# Patient Record
Sex: FEMALE | Race: WHITE | NOT HISPANIC OR LATINO | Employment: OTHER
[De-identification: names, ages, dates, MRNs, and addresses within clinical notes are randomized per-mention and may not be internally consistent; named-entity substitution may affect disease eponyms.]

---

## 2019-10-01 ENCOUNTER — HEALTH MAINTENANCE LETTER (OUTPATIENT)
Age: 69
End: 2019-10-01

## 2019-12-15 ENCOUNTER — HEALTH MAINTENANCE LETTER (OUTPATIENT)
Age: 69
End: 2019-12-15

## 2020-08-11 DIAGNOSIS — Z11.59 ENCOUNTER FOR SCREENING FOR OTHER VIRAL DISEASES: Primary | ICD-10-CM

## 2020-08-26 DIAGNOSIS — Z11.59 ENCOUNTER FOR SCREENING FOR OTHER VIRAL DISEASES: ICD-10-CM

## 2020-08-26 PROCEDURE — U0003 INFECTIOUS AGENT DETECTION BY NUCLEIC ACID (DNA OR RNA); SEVERE ACUTE RESPIRATORY SYNDROME CORONAVIRUS 2 (SARS-COV-2) (CORONAVIRUS DISEASE [COVID-19]), AMPLIFIED PROBE TECHNIQUE, MAKING USE OF HIGH THROUGHPUT TECHNOLOGIES AS DESCRIBED BY CMS-2020-01-R: HCPCS | Performed by: UROLOGY

## 2020-08-27 LAB
SARS-COV-2 RNA SPEC QL NAA+PROBE: NOT DETECTED
SPECIMEN SOURCE: NORMAL

## 2020-08-27 RX ORDER — HYDROCODONE BITARTRATE AND ACETAMINOPHEN 5; 325 MG/1; MG/1
1 TABLET ORAL EVERY 6 HOURS PRN
Status: ON HOLD | COMMUNITY
End: 2021-09-16

## 2020-08-28 ENCOUNTER — APPOINTMENT (OUTPATIENT)
Dept: GENERAL RADIOLOGY | Facility: CLINIC | Age: 70
End: 2020-08-28
Attending: UROLOGY
Payer: COMMERCIAL

## 2020-08-28 ENCOUNTER — ANESTHESIA EVENT (OUTPATIENT)
Dept: SURGERY | Facility: CLINIC | Age: 70
End: 2020-08-28
Payer: COMMERCIAL

## 2020-08-28 ENCOUNTER — ANESTHESIA (OUTPATIENT)
Dept: SURGERY | Facility: CLINIC | Age: 70
End: 2020-08-28
Payer: COMMERCIAL

## 2020-08-28 ENCOUNTER — HOSPITAL ENCOUNTER (OUTPATIENT)
Facility: CLINIC | Age: 70
Discharge: HOME | End: 2020-08-28
Attending: UROLOGY | Admitting: UROLOGY
Payer: COMMERCIAL

## 2020-08-28 VITALS
HEART RATE: 87 BPM | OXYGEN SATURATION: 96 % | BODY MASS INDEX: 30.32 KG/M2 | TEMPERATURE: 97.7 F | WEIGHT: 160.6 LBS | HEIGHT: 61 IN | DIASTOLIC BLOOD PRESSURE: 83 MMHG | SYSTOLIC BLOOD PRESSURE: 134 MMHG | RESPIRATION RATE: 14 BRPM

## 2020-08-28 DIAGNOSIS — R31.9 HEMATURIA, UNSPECIFIED TYPE: Primary | ICD-10-CM

## 2020-08-28 DIAGNOSIS — N20.0 RENAL CALCULUS: ICD-10-CM

## 2020-08-28 LAB
COPATH REPORT: NORMAL
GLUCOSE BLDC GLUCOMTR-MCNC: 133 MG/DL (ref 70–99)
GLUCOSE BLDC GLUCOMTR-MCNC: 150 MG/DL (ref 70–99)

## 2020-08-28 PROCEDURE — 25500064 ZZH RX 255 OP 636: Performed by: UROLOGY

## 2020-08-28 PROCEDURE — C1758 CATHETER, URETERAL: HCPCS | Performed by: UROLOGY

## 2020-08-28 PROCEDURE — C1894 INTRO/SHEATH, NON-LASER: HCPCS | Performed by: UROLOGY

## 2020-08-28 PROCEDURE — 88300 SURGICAL PATH GROSS: CPT | Performed by: UROLOGY

## 2020-08-28 PROCEDURE — 36000058 ZZH SURGERY LEVEL 3 EA 15 ADDTL MIN: Performed by: UROLOGY

## 2020-08-28 PROCEDURE — 40000170 ZZH STATISTIC PRE-PROCEDURE ASSESSMENT II: Performed by: UROLOGY

## 2020-08-28 PROCEDURE — C2617 STENT, NON-COR, TEM W/O DEL: HCPCS | Performed by: UROLOGY

## 2020-08-28 PROCEDURE — 25000566 ZZH SEVOFLURANE, EA 15 MIN: Performed by: UROLOGY

## 2020-08-28 PROCEDURE — 25000128 H RX IP 250 OP 636: Performed by: PHYSICIAN ASSISTANT

## 2020-08-28 PROCEDURE — 25800030 ZZH RX IP 258 OP 636: Performed by: NURSE ANESTHETIST, CERTIFIED REGISTERED

## 2020-08-28 PROCEDURE — 36000056 ZZH SURGERY LEVEL 3 1ST 30 MIN: Performed by: UROLOGY

## 2020-08-28 PROCEDURE — C1769 GUIDE WIRE: HCPCS | Performed by: UROLOGY

## 2020-08-28 PROCEDURE — 71000027 ZZH RECOVERY PHASE 2 EACH 15 MINS: Performed by: UROLOGY

## 2020-08-28 PROCEDURE — 40000277 XR SURGERY CARM FLUORO LESS THAN 5 MIN W STILLS: Mod: TC

## 2020-08-28 PROCEDURE — 25000125 ZZHC RX 250: Performed by: UROLOGY

## 2020-08-28 PROCEDURE — 37000008 ZZH ANESTHESIA TECHNICAL FEE, 1ST 30 MIN: Performed by: UROLOGY

## 2020-08-28 PROCEDURE — 25000128 H RX IP 250 OP 636: Performed by: ANESTHESIOLOGY

## 2020-08-28 PROCEDURE — 82962 GLUCOSE BLOOD TEST: CPT

## 2020-08-28 PROCEDURE — 25000125 ZZHC RX 250: Performed by: NURSE ANESTHETIST, CERTIFIED REGISTERED

## 2020-08-28 PROCEDURE — 27210794 ZZH OR GENERAL SUPPLY STERILE: Performed by: UROLOGY

## 2020-08-28 PROCEDURE — 25000128 H RX IP 250 OP 636: Performed by: NURSE ANESTHETIST, CERTIFIED REGISTERED

## 2020-08-28 PROCEDURE — 82365 CALCULUS SPECTROSCOPY: CPT | Performed by: UROLOGY

## 2020-08-28 PROCEDURE — 25800025 ZZH RX 258: Performed by: UROLOGY

## 2020-08-28 PROCEDURE — 88300 SURGICAL PATH GROSS: CPT | Mod: 26 | Performed by: UROLOGY

## 2020-08-28 PROCEDURE — 71000012 ZZH RECOVERY PHASE 1 LEVEL 1 FIRST HR: Performed by: UROLOGY

## 2020-08-28 PROCEDURE — 25000132 ZZH RX MED GY IP 250 OP 250 PS 637: Performed by: UROLOGY

## 2020-08-28 PROCEDURE — 37000009 ZZH ANESTHESIA TECHNICAL FEE, EACH ADDTL 15 MIN: Performed by: UROLOGY

## 2020-08-28 DEVICE — STENT URETERAL CONTOUR SOFT PERCUFLEX 6FRX24CM: Type: IMPLANTABLE DEVICE | Site: URETER | Status: FUNCTIONAL

## 2020-08-28 RX ORDER — MEPERIDINE HYDROCHLORIDE 25 MG/ML
12.5 INJECTION INTRAMUSCULAR; INTRAVENOUS; SUBCUTANEOUS
Status: DISCONTINUED | OUTPATIENT
Start: 2020-08-28 | End: 2020-08-28 | Stop reason: HOSPADM

## 2020-08-28 RX ORDER — ONDANSETRON 2 MG/ML
4 INJECTION INTRAMUSCULAR; INTRAVENOUS EVERY 30 MIN PRN
Status: DISCONTINUED | OUTPATIENT
Start: 2020-08-28 | End: 2020-08-28 | Stop reason: HOSPADM

## 2020-08-28 RX ORDER — MAGNESIUM HYDROXIDE 1200 MG/15ML
LIQUID ORAL PRN
Status: DISCONTINUED | OUTPATIENT
Start: 2020-08-28 | End: 2020-08-28 | Stop reason: HOSPADM

## 2020-08-28 RX ORDER — ONDANSETRON 4 MG/1
4 TABLET, ORALLY DISINTEGRATING ORAL EVERY 30 MIN PRN
Status: DISCONTINUED | OUTPATIENT
Start: 2020-08-28 | End: 2020-08-28 | Stop reason: HOSPADM

## 2020-08-28 RX ORDER — HYDROMORPHONE HYDROCHLORIDE 1 MG/ML
.3-.5 INJECTION, SOLUTION INTRAMUSCULAR; INTRAVENOUS; SUBCUTANEOUS EVERY 10 MIN PRN
Status: DISCONTINUED | OUTPATIENT
Start: 2020-08-28 | End: 2020-08-28 | Stop reason: HOSPADM

## 2020-08-28 RX ORDER — CEFAZOLIN SODIUM 2 G/100ML
2 INJECTION, SOLUTION INTRAVENOUS
Status: COMPLETED | OUTPATIENT
Start: 2020-08-28 | End: 2020-08-28

## 2020-08-28 RX ORDER — HYDROCODONE BITARTRATE AND ACETAMINOPHEN 5; 325 MG/1; MG/1
1 TABLET ORAL ONCE
Status: COMPLETED | OUTPATIENT
Start: 2020-08-28 | End: 2020-08-28

## 2020-08-28 RX ORDER — SODIUM CHLORIDE, SODIUM LACTATE, POTASSIUM CHLORIDE, CALCIUM CHLORIDE 600; 310; 30; 20 MG/100ML; MG/100ML; MG/100ML; MG/100ML
INJECTION, SOLUTION INTRAVENOUS CONTINUOUS
Status: DISCONTINUED | OUTPATIENT
Start: 2020-08-28 | End: 2020-08-28 | Stop reason: HOSPADM

## 2020-08-28 RX ORDER — FENTANYL CITRATE 50 UG/ML
INJECTION, SOLUTION INTRAMUSCULAR; INTRAVENOUS PRN
Status: DISCONTINUED | OUTPATIENT
Start: 2020-08-28 | End: 2020-08-28

## 2020-08-28 RX ORDER — HYDRALAZINE HYDROCHLORIDE 20 MG/ML
2.5-5 INJECTION INTRAMUSCULAR; INTRAVENOUS EVERY 10 MIN PRN
Status: DISCONTINUED | OUTPATIENT
Start: 2020-08-28 | End: 2020-08-28 | Stop reason: HOSPADM

## 2020-08-28 RX ORDER — ONDANSETRON 2 MG/ML
INJECTION INTRAMUSCULAR; INTRAVENOUS PRN
Status: DISCONTINUED | OUTPATIENT
Start: 2020-08-28 | End: 2020-08-28

## 2020-08-28 RX ORDER — SULFAMETHOXAZOLE/TRIMETHOPRIM 800-160 MG
1 TABLET ORAL 2 TIMES DAILY
Qty: 10 TABLET | Refills: 0 | Status: SHIPPED | OUTPATIENT
Start: 2020-08-28 | End: 2020-09-02

## 2020-08-28 RX ORDER — PROPOFOL 10 MG/ML
INJECTION, EMULSION INTRAVENOUS PRN
Status: DISCONTINUED | OUTPATIENT
Start: 2020-08-28 | End: 2020-08-28

## 2020-08-28 RX ORDER — PROPOFOL 10 MG/ML
INJECTION, EMULSION INTRAVENOUS CONTINUOUS PRN
Status: DISCONTINUED | OUTPATIENT
Start: 2020-08-28 | End: 2020-08-28

## 2020-08-28 RX ORDER — ACETAMINOPHEN 325 MG/1
650 TABLET ORAL ONCE
Status: DISCONTINUED | OUTPATIENT
Start: 2020-08-28 | End: 2020-08-28 | Stop reason: HOSPADM

## 2020-08-28 RX ORDER — HYDROCODONE BITARTRATE AND ACETAMINOPHEN 5; 325 MG/1; MG/1
1 TABLET ORAL EVERY 4 HOURS PRN
Qty: 15 TABLET | Refills: 0 | Status: ON HOLD | OUTPATIENT
Start: 2020-08-28 | End: 2021-09-16

## 2020-08-28 RX ORDER — ALBUTEROL SULFATE 0.83 MG/ML
2.5 SOLUTION RESPIRATORY (INHALATION) EVERY 4 HOURS PRN
Status: DISCONTINUED | OUTPATIENT
Start: 2020-08-28 | End: 2020-08-28 | Stop reason: HOSPADM

## 2020-08-28 RX ORDER — NALOXONE HYDROCHLORIDE 0.4 MG/ML
.1-.4 INJECTION, SOLUTION INTRAMUSCULAR; INTRAVENOUS; SUBCUTANEOUS
Status: DISCONTINUED | OUTPATIENT
Start: 2020-08-28 | End: 2020-08-28 | Stop reason: HOSPADM

## 2020-08-28 RX ORDER — SODIUM CHLORIDE, SODIUM LACTATE, POTASSIUM CHLORIDE, CALCIUM CHLORIDE 600; 310; 30; 20 MG/100ML; MG/100ML; MG/100ML; MG/100ML
INJECTION, SOLUTION INTRAVENOUS CONTINUOUS PRN
Status: DISCONTINUED | OUTPATIENT
Start: 2020-08-28 | End: 2020-08-28

## 2020-08-28 RX ORDER — FENTANYL CITRATE 50 UG/ML
25-50 INJECTION, SOLUTION INTRAMUSCULAR; INTRAVENOUS
Status: DISCONTINUED | OUTPATIENT
Start: 2020-08-28 | End: 2020-08-28 | Stop reason: HOSPADM

## 2020-08-28 RX ORDER — DEXAMETHASONE SODIUM PHOSPHATE 4 MG/ML
INJECTION, SOLUTION INTRA-ARTICULAR; INTRALESIONAL; INTRAMUSCULAR; INTRAVENOUS; SOFT TISSUE PRN
Status: DISCONTINUED | OUTPATIENT
Start: 2020-08-28 | End: 2020-08-28

## 2020-08-28 RX ORDER — IOPAMIDOL 612 MG/ML
INJECTION, SOLUTION INTRAVASCULAR PRN
Status: DISCONTINUED | OUTPATIENT
Start: 2020-08-28 | End: 2020-08-28 | Stop reason: HOSPADM

## 2020-08-28 RX ORDER — LIDOCAINE HYDROCHLORIDE 20 MG/ML
INJECTION, SOLUTION INFILTRATION; PERINEURAL PRN
Status: DISCONTINUED | OUTPATIENT
Start: 2020-08-28 | End: 2020-08-28

## 2020-08-28 RX ADMIN — CEFAZOLIN SODIUM 2 G: 2 INJECTION, SOLUTION INTRAVENOUS at 10:06

## 2020-08-28 RX ADMIN — MIDAZOLAM 2 MG: 1 INJECTION INTRAMUSCULAR; INTRAVENOUS at 09:51

## 2020-08-28 RX ADMIN — FENTANYL CITRATE 50 MCG: 0.05 INJECTION, SOLUTION INTRAMUSCULAR; INTRAVENOUS at 11:05

## 2020-08-28 RX ADMIN — DEXAMETHASONE SODIUM PHOSPHATE 4 MG: 4 INJECTION, SOLUTION INTRA-ARTICULAR; INTRALESIONAL; INTRAMUSCULAR; INTRAVENOUS; SOFT TISSUE at 10:14

## 2020-08-28 RX ADMIN — PROPOFOL 25 MCG/KG/MIN: 10 INJECTION, EMULSION INTRAVENOUS at 09:55

## 2020-08-28 RX ADMIN — FENTANYL CITRATE 50 MCG: 50 INJECTION, SOLUTION INTRAMUSCULAR; INTRAVENOUS at 10:02

## 2020-08-28 RX ADMIN — PROPOFOL 120 MG: 10 INJECTION, EMULSION INTRAVENOUS at 09:54

## 2020-08-28 RX ADMIN — PHENYLEPHRINE HYDROCHLORIDE 100 MCG: 10 INJECTION INTRAVENOUS at 10:09

## 2020-08-28 RX ADMIN — ONDANSETRON 4 MG: 2 INJECTION INTRAMUSCULAR; INTRAVENOUS at 10:37

## 2020-08-28 RX ADMIN — FENTANYL CITRATE 50 MCG: 50 INJECTION, SOLUTION INTRAMUSCULAR; INTRAVENOUS at 10:18

## 2020-08-28 RX ADMIN — SODIUM CHLORIDE, POTASSIUM CHLORIDE, SODIUM LACTATE AND CALCIUM CHLORIDE: 600; 310; 30; 20 INJECTION, SOLUTION INTRAVENOUS at 09:51

## 2020-08-28 RX ADMIN — HYDROCODONE BITARTRATE AND ACETAMINOPHEN 1 TABLET: 5; 325 TABLET ORAL at 11:23

## 2020-08-28 RX ADMIN — LIDOCAINE HYDROCHLORIDE 60 MG: 20 INJECTION, SOLUTION INFILTRATION; PERINEURAL at 09:54

## 2020-08-28 ASSESSMENT — MIFFLIN-ST. JEOR: SCORE: 1190.86

## 2020-08-28 ASSESSMENT — ENCOUNTER SYMPTOMS
DYSRHYTHMIAS: 0
SEIZURES: 0

## 2020-08-28 ASSESSMENT — COPD QUESTIONNAIRES: COPD: 0

## 2020-08-28 ASSESSMENT — LIFESTYLE VARIABLES: TOBACCO_USE: 0

## 2020-08-28 NOTE — BRIEF OP NOTE
[facility] Urology Brief Operative Note    Pre-operative diagnosis: nephrolithiasis [N20.0]   Post-operative diagnosis: Same   Procedure: Procedure(s):  CYSTOSCOPY, LEFT RETROGRADE, LEFT URETEROSCOPY, WITH LITHOTRIPSY USING LASER, STONE BASKETING, LEFT URETERAL STENT INSERTION     Surgeon: Juan Luis Nagy MD         Anesthesia: General mask     Estimated blood loss: None   Total IV fluids:  ml   Blood transfusion: No transfusion was given during surgery   Total urine output: Not measured   Drains: None   Specimens: Left renal stone   Implants: 6F x 24 cm left ureteral stent   Complications: None   Condition: Patient taken to recovery in stable condition.     Findings:   None.  Complications: 9mm left renal pelvis stone, easily fragmented.  Moderate stone gravel.  Follow-up : 2 weeks for stent removal    Juan Luis Nagy MD  8/28/2020

## 2020-08-28 NOTE — ANESTHESIA CARE TRANSFER NOTE
Patient: Charlette Sharp    Procedure(s):  CYSTOSCOPY, LEFT RETROGRADE, LEFT URETEROSCOPY, WITH LITHOTRIPSY USING LASER, STONE BASKETING, LEFT URETERAL STENT INSERTION    Diagnosis: Uric acid nephrolithiasis [N20.0]  Diagnosis Additional Information: No value filed.    Anesthesia Type:   General     Note:  Airway :Face Mask    Comments: VSS. Airway and IV patent. Patient comfortable. Report to RN. Stable care transfer.      Vitals: (Last set prior to Anesthesia Care Transfer)    CRNA VITALS  8/28/2020 1014 - 8/28/2020 1049      8/28/2020             Pulse:  69    SpO2:  100 %    Resp Rate (observed):  11    Resp Rate (set):  10                Electronically Signed By: AR Ibarra CRNA  August 28, 2020  10:49 AM

## 2020-08-28 NOTE — ANESTHESIA POSTPROCEDURE EVALUATION
Patient: Charlette Sharp    Procedure(s):  CYSTOSCOPY, LEFT RETROGRADE, LEFT URETEROSCOPY, WITH LITHOTRIPSY USING LASER, STONE BASKETING, LEFT URETERAL STENT INSERTION    Diagnosis:Uric acid nephrolithiasis [N20.0]  Diagnosis Additional Information: No value filed.    Anesthesia Type:  General    Note:  Anesthesia Post Evaluation    Patient location during evaluation: PACU  Patient participation: Able to fully participate in evaluation  Level of consciousness: awake and alert  Pain management: adequate  Airway patency: patent  Cardiovascular status: acceptable  Respiratory status: acceptable  Hydration status: acceptable  PONV: none     Anesthetic complications: None          Last vitals:  Vitals:    08/28/20 1115 08/28/20 1130 08/28/20 1207   BP: 132/85 (!) 140/81 134/83   Pulse: 87 91 87   Resp: 17 13 14   Temp: 36.3  C (97.3  F) 36.9  C (98.4  F) 36.5  C (97.7  F)   SpO2: 94% 94% 96%         Electronically Signed By: Erna Rodarte MD  August 28, 2020  1:37 PM

## 2020-08-28 NOTE — OR NURSING
Fingerstick blood glucose 133  Anesthesiologist Dr Hewitt informed. PNDS met, po per I&O sheet. Pt dressed, up in recliner and transported to Phase 2.

## 2020-08-28 NOTE — OP NOTE
Procedure Date: 08/28/2020      PREOPERATIVE DIAGNOSIS:  Left renal calculus.      POSTOPERATIVE DIAGNOSIS:  Left renal calculus.      PROCEDURE PERFORMED:   1.  Left ureteroscopy with holmium laser lithotripsy and stone basketing.   2.  Left retrograde pyelogram.   3.  Left ureteral stent placement.   4.  Intraoperative interpretation of fluoroscopic images.      OPERATIVE INDICATIONS:  The patient is a 70-year-old woman who presented with left flank pain, and CT revealed a 9 mm left renal pelvis stone.  After understanding the various management options, she elected to undergo today's procedure.      DESCRIPTION OF PROCEDURE:  After properly identifying the patient and verifying informed consent, she was brought to the operating room in stable condition.  After sufficient induction of general anesthetic, she was placed in lithotomy position, and her genitalia and perineum were prepped and draped in the usual fashion.  The case was begun by inserting a well-lubricated cystoscope in the patient's bladder under direct vision.  The bladder was surveyed in its entirety and found to be free from tumor, stones or diverticula.  The left ureteral orifice was identified, and I passed a sensor guidewire up to the renal pelvis on fluoroscopy.  The stone was not well seen on  imaging.  I then passed a 6-H[place] Konger open-ended catheter over the wire and injected contrast to perform a left retrograde pyelogram.  There was no hydronephrosis.  There was a filling defect in the renal pelvis, consistent with the patient's known stone.  There were no other filling defects.  I then placed a Super Stiff wire through the open-ended catheter, advanced a 10-H[place] Konger dual-lumen catheter over this, and then advanced a sensor guidewire alongside the Super Stiff wire.  I then advanced an 11/13 ureteral access sheath over the Super Stiff wire, first with the 11-Hong Konger inner component.  There was mild resistance, but this did pass up to the  proximal ureter without too much difficulty.  I then advanced the combined inner and outer sheath over the Super Stiff wire up to the proximal ureter.  There was resistance here, so I stopped advancing at this point, I then introduced the flexible ureteroscope and visualized a spiculated gold colored stone in the renal pelvis.  I then used the 200 micron holmium laser fiber to fragment this.  This fragmented very easily and essentially shattered once laser energy was applied.  I then used the [place] Halo basket to retrieve several small fragments to send for stone composition analysis.  I then reintroduced the scope and performed systematic pyeloscopy.  There was a moderate amount of stone gravel with pieces approximately 1-2 mm, but no pieces larger than this were visible.  I then performed pullback ureteroscopy.  In the proximal ureter, there was a very small mucosal laceration seen.  The remainder of the ureter was without evidence of injury.  I then injected a small amount of contrast to delineate the collecting system.  There was a very small amount of extravasation around the ureter where that laceration was seen, but otherwise there was no hydronephrosis and no extravasation.  I then placed a 6-Nepali x 24 cm double-J ureteral stent over the guidewire with the assistance of fluoroscopy.  Excellent proximal and distal coils were visualized.  The bladder was then emptied via the cystoscope.  The patient was awoken from anesthesia and brought to recovery in stable condition.  There was no blood loss.      The patient should follow-up in about 2 weeks for stent removal.         RAMÍREZ GUZMAN MD             D: 2020   T: 2020   MT:       Name:     JENY WILLAMS   MRN:      [id number]        Account:        [id number]   :      1950           Procedure Date: 2020      Document: [id number]

## 2020-08-28 NOTE — ANESTHESIA PREPROCEDURE EVALUATION
Anesthesia Pre-Procedure Evaluation    Patient: Charlette Sharp   MRN: [id number] : 1950          Preoperative Diagnosis: Uric acid nephrolithiasis [N20.0]    Procedure(s):  CYSTOSCOPY, LEFT URETEROSCOPY, WITH LITHOTRIPSY USING LASER AND LEFT URETERAL STENT INSERTION    Past Medical History:   Diagnosis Date     Anemia      B12 deficiency      Chronic diarrhea      Depressive disorder      Diabetes mellitus (H)      Diverticulitis      Gastro-oesophageal reflux disease      Hypercholesteraemia      Hyperlipidemia      Hypertension      Insomnia      Kidney stone      MS (multiple sclerosis) (H)     generalized weakness     Pancreatic pseudocyst      Pancreatitis      Restless legs      Past Surgical History:   Procedure Laterality Date     APPENDECTOMY       CHOLECYSTECTOMY       COLONOSCOPY       LAPAROSCOPIC CRYOABLATION TUMOR KIDNEY  2013    Procedure: LAPAROSCOPIC CRYOABLATION TUMOR KIDNEY;;  Surgeon: Elijah Corona MD;  Location: [facility] OR     NEPHRECTOMY PARTIAL  2013    Procedure: NEPHRECTOMY PARTIAL;  LAPAROSCOPIC RIGHT RENAL CRYOABLATION;  Surgeon: Elijah Corona MD;  Location: [facility] OR     No Known Allergies  Prior to Admission medications    Medication Sig Start Date End Date Taking? Authorizing Provider   Ascorbic Acid (VITAMIN C PO) Take 100 mg by mouth daily    Yes Reported, Patient   Cyanocobalamin (VITAMIN B 12 PO) Take 1,000 mcg by mouth daily    Yes Reported, Patient   Dalfampridine (AMPYRA PO) Take 10 mg by mouth 2 times daily   Yes Unknown, Entered By History   HYDROcodone-acetaminophen (NORCO) 5-325 MG tablet Take 1 tablet by mouth every 6 hours as needed for severe pain   Yes Reported, Patient   insulin glargine (LANTUS PEN) 100 UNIT/ML pen Inject 23 Units Subcutaneous At Bedtime    Yes Reported, Patient   METFORMIN HCL PO Take 1,000 mg by mouth 2 times daily (with meals)   Yes Unknown, Entered By History   OMEPRAZOLE PO Take 20 mg by mouth daily    Yes  Reported, Patient   Pramipexole Dihydrochloride (MIRAPEX PO) Take 1 mg by mouth 2 times daily    Yes Reported, Patient   SERTRALINE HCL PO Take 50 mg by mouth daily   Yes Reported, Patient   Simvastatin (ZOCOR PO) Take 40 mg by mouth daily    Yes Reported, Patient   TRAZODONE HCL PO Take 50 mg by mouth nightly as needed    Yes Reported, Patient   VITAMIN D, CHOLECALCIFEROL, PO Take 2,000 Units by mouth daily    Yes Unknown, Entered By History   ACCU-CHEK ESPERANZA None Entered    Reported, Patient   ASPIRIN PO Take 81 mg by mouth daily    Unknown, Entered By History   calcium-magnesium (CALMAG) 500-250 MG TABS Take 1 tablet by mouth daily    Reported, Patient   Ferrous Sulfate (IRON SUPPLEMENT PO) Take 325 mg by mouth 2 times daily (with meals)     Reported, Patient     RECENT LABS: hgb 13.6, plt 296  ECG: NSR    Anesthesia Evaluation     .             ROS/MED HX    ENT/Pulmonary:      (-) tobacco use, asthma, COPD and sleep apnea   Neurologic:     (+)Multiple Sclerosis other neuro RLS   (-) seizures, CVA and migraines   Cardiovascular:     (+) Dyslipidemia, hypertension----. : . . . :. .      (-) CAD, LOMAS, arrhythmias and valvular problems/murmurs   METS/Exercise Tolerance:  1 - Eating, dressing   Hematologic:        (-) history of blood clots, anemia and other hematologic disorder   Musculoskeletal:   (+) arthritis,  -       GI/Hepatic:     (+) GERD Other GI/Hepatic hx pancreatitis     (-) liver disease   Renal/Genitourinary:     (+) Nephrolithiasis ,    (-) renal disease   Endo:     (+) type II DM Last HgA1c: 7.0 .   (-) Type I DM and obesity   Psychiatric:     (+) depression     (-) psychiatric history   Infectious Disease:        (-) Recent Fever   Malignancy:         Other:                          Physical Exam  Normal systems: cardiovascular, pulmonary and dental    Airway   Mallampati: II  TM distance: >3 FB  Neck ROM: full    Dental     Cardiovascular   Rhythm and rate: regular and normal      Pulmonary     "breath sounds clear to auscultation            Preop Vitals  BP Readings from Last 3 Encounters:   08/28/20 129/85   05/08/15 97/69   11/22/13 106/70    Pulse Readings from Last 3 Encounters:   09/14/13 52      Resp Readings from Last 3 Encounters:   08/28/20 16   05/08/15 16   11/22/13 18    SpO2 Readings from Last 3 Encounters:   08/28/20 98%   05/08/15 95%   11/22/13 95%      Temp Readings from Last 1 Encounters:   08/28/20 37  C (98.6  F) (Oral)    Ht Readings from Last 1 Encounters:   08/28/20 1.549 m (5' 1\")      Wt Readings from Last 1 Encounters:   08/28/20 72.8 kg (160 lb 9.6 oz)    Estimated body mass index is 30.35 kg/m  as calculated from the following:    Height as of this encounter: 1.549 m (5' 1\").    Weight as of this encounter: 72.8 kg (160 lb 9.6 oz).       Anesthesia Plan      History & Physical Review      ASA Status:  3 .    NPO Status:  > 8 hours    Plan for General Maintenance will be Balanced.    PONV prophylaxis:  Ondansetron (or other 5HT-3) and Dexamethasone or Solumedrol  Background propofol infusion        Postoperative Care  Postoperative pain management:  Multi-modal analgesia.      Consents  Anesthetic plan, risks, benefits and alternatives discussed with:  Patient..                 Erna Rodarte MD  "

## 2020-09-03 LAB
APPEARANCE STONE: NORMAL
COMPN STONE: NORMAL
NUMBER STONE: 6
SIZE STONE: NORMAL MM
WT STONE: 2 MG

## 2021-01-15 ENCOUNTER — HEALTH MAINTENANCE LETTER (OUTPATIENT)
Age: 71
End: 2021-01-15

## 2021-09-04 ENCOUNTER — HEALTH MAINTENANCE LETTER (OUTPATIENT)
Age: 71
End: 2021-09-04

## 2021-09-15 ENCOUNTER — APPOINTMENT (OUTPATIENT)
Dept: CT IMAGING | Facility: CLINIC | Age: 71
End: 2021-09-15
Attending: EMERGENCY MEDICINE
Payer: COMMERCIAL

## 2021-09-15 ENCOUNTER — HOSPITAL ENCOUNTER (OUTPATIENT)
Facility: CLINIC | Age: 71
Setting detail: OBSERVATION
Discharge: HOME | End: 2021-09-17
Attending: EMERGENCY MEDICINE | Admitting: INTERNAL MEDICINE
Payer: COMMERCIAL

## 2021-09-15 DIAGNOSIS — N13.5 UPJ (URETEROPELVIC JUNCTION) OBSTRUCTION: ICD-10-CM

## 2021-09-15 LAB
ALBUMIN SERPL-MCNC: 3.4 G/DL (ref 3.4–5)
ALBUMIN UR-MCNC: 100 MG/DL
ALP SERPL-CCNC: 78 U/L (ref 40–150)
ALT SERPL W P-5'-P-CCNC: 21 U/L (ref 0–50)
ANION GAP SERPL CALCULATED.3IONS-SCNC: 7 MMOL/L (ref 3–14)
APPEARANCE UR: ABNORMAL
AST SERPL W P-5'-P-CCNC: 12 U/L (ref 0–45)
BASOPHILS # BLD AUTO: 0 10E3/UL (ref 0–0.2)
BASOPHILS NFR BLD AUTO: 0 %
BILIRUB SERPL-MCNC: 0.5 MG/DL (ref 0.2–1.3)
BILIRUB UR QL STRIP: NEGATIVE
BUN SERPL-MCNC: 15 MG/DL (ref 7–30)
CALCIUM SERPL-MCNC: 8.1 MG/DL (ref 8.5–10.1)
CHLORIDE BLD-SCNC: 108 MMOL/L (ref 94–109)
CO2 SERPL-SCNC: 27 MMOL/L (ref 20–32)
COLOR UR AUTO: ABNORMAL
CREAT BLD-MCNC: 1.1 MG/DL (ref 0.5–1)
CREAT SERPL-MCNC: 1.04 MG/DL (ref 0.52–1.04)
EOSINOPHIL # BLD AUTO: 0.3 10E3/UL (ref 0–0.7)
EOSINOPHIL NFR BLD AUTO: 3 %
ERYTHROCYTE [DISTWIDTH] IN BLOOD BY AUTOMATED COUNT: 13.5 % (ref 10–15)
GFR SERPL CREATININE-BSD FRML MDRD: 51 ML/MIN/1.73M2
GFR SERPL CREATININE-BSD FRML MDRD: 55 ML/MIN/1.73M2
GLUCOSE BLD-MCNC: 172 MG/DL (ref 70–99)
GLUCOSE UR STRIP-MCNC: NEGATIVE MG/DL
HCT VFR BLD AUTO: 35.8 % (ref 35–47)
HGB BLD-MCNC: 11 G/DL (ref 11.7–15.7)
HGB UR QL STRIP: ABNORMAL
HOLD SPECIMEN: NORMAL
HOLD SPECIMEN: NORMAL
IMM GRANULOCYTES # BLD: 0 10E3/UL
IMM GRANULOCYTES NFR BLD: 0 %
KETONES UR STRIP-MCNC: 10 MG/DL
LACTATE SERPL-SCNC: 1 MMOL/L (ref 0.7–2)
LACTATE SERPL-SCNC: 2.9 MMOL/L (ref 0.7–2)
LEUKOCYTE ESTERASE UR QL STRIP: ABNORMAL
LIPASE SERPL-CCNC: 69 U/L (ref 73–393)
LYMPHOCYTES # BLD AUTO: 1.7 10E3/UL (ref 0.8–5.3)
LYMPHOCYTES NFR BLD AUTO: 17 %
MCH RBC QN AUTO: 26.4 PG (ref 26.5–33)
MCHC RBC AUTO-ENTMCNC: 30.7 G/DL (ref 31.5–36.5)
MCV RBC AUTO: 86 FL (ref 78–100)
MONOCYTES # BLD AUTO: 0.5 10E3/UL (ref 0–1.3)
MONOCYTES NFR BLD AUTO: 5 %
MUCOUS THREADS #/AREA URNS LPF: PRESENT /LPF
NEUTROPHILS # BLD AUTO: 7.3 10E3/UL (ref 1.6–8.3)
NEUTROPHILS NFR BLD AUTO: 75 %
NITRATE UR QL: NEGATIVE
NRBC # BLD AUTO: 0 10E3/UL
NRBC BLD AUTO-RTO: 0 /100
PH UR STRIP: 5.5 [PH] (ref 5–7)
PLATELET # BLD AUTO: 244 10E3/UL (ref 150–450)
POTASSIUM BLD-SCNC: 4 MMOL/L (ref 3.4–5.3)
PROT SERPL-MCNC: 6.7 G/DL (ref 6.8–8.8)
RBC # BLD AUTO: 4.17 10E6/UL (ref 3.8–5.2)
RBC URINE: >182 /HPF
SARS-COV-2 RNA RESP QL NAA+PROBE: NEGATIVE
SODIUM SERPL-SCNC: 142 MMOL/L (ref 133–144)
SP GR UR STRIP: 1.03 (ref 1–1.03)
SQUAMOUS EPITHELIAL: 1 /HPF
UROBILINOGEN UR STRIP-MCNC: NORMAL MG/DL
WBC # BLD AUTO: 9.9 10E3/UL (ref 4–11)
WBC URINE: 37 /HPF
YEAST #/AREA URNS HPF: ABNORMAL /HPF

## 2021-09-15 PROCEDURE — 99285 EMERGENCY DEPT VISIT HI MDM: CPT | Mod: 25

## 2021-09-15 PROCEDURE — 258N000003 HC RX IP 258 OP 636: Performed by: EMERGENCY MEDICINE

## 2021-09-15 PROCEDURE — 250N000009 HC RX 250: Performed by: EMERGENCY MEDICINE

## 2021-09-15 PROCEDURE — 36415 COLL VENOUS BLD VENIPUNCTURE: CPT | Performed by: EMERGENCY MEDICINE

## 2021-09-15 PROCEDURE — 250N000011 HC RX IP 250 OP 636: Performed by: EMERGENCY MEDICINE

## 2021-09-15 PROCEDURE — 80053 COMPREHEN METABOLIC PANEL: CPT | Performed by: EMERGENCY MEDICINE

## 2021-09-15 PROCEDURE — 96365 THER/PROPH/DIAG IV INF INIT: CPT | Mod: 59

## 2021-09-15 PROCEDURE — 82565 ASSAY OF CREATININE: CPT | Mod: 91

## 2021-09-15 PROCEDURE — 96376 TX/PRO/DX INJ SAME DRUG ADON: CPT

## 2021-09-15 PROCEDURE — 83605 ASSAY OF LACTIC ACID: CPT | Performed by: EMERGENCY MEDICINE

## 2021-09-15 PROCEDURE — 83690 ASSAY OF LIPASE: CPT | Performed by: EMERGENCY MEDICINE

## 2021-09-15 PROCEDURE — 74177 CT ABD & PELVIS W/CONTRAST: CPT

## 2021-09-15 PROCEDURE — 87635 SARS-COV-2 COVID-19 AMP PRB: CPT | Performed by: EMERGENCY MEDICINE

## 2021-09-15 PROCEDURE — 96361 HYDRATE IV INFUSION ADD-ON: CPT

## 2021-09-15 PROCEDURE — 83036 HEMOGLOBIN GLYCOSYLATED A1C: CPT | Performed by: INTERNAL MEDICINE

## 2021-09-15 PROCEDURE — 87040 BLOOD CULTURE FOR BACTERIA: CPT | Performed by: EMERGENCY MEDICINE

## 2021-09-15 PROCEDURE — C9803 HOPD COVID-19 SPEC COLLECT: HCPCS

## 2021-09-15 PROCEDURE — 96375 TX/PRO/DX INJ NEW DRUG ADDON: CPT

## 2021-09-15 PROCEDURE — 85025 COMPLETE CBC W/AUTO DIFF WBC: CPT | Performed by: EMERGENCY MEDICINE

## 2021-09-15 PROCEDURE — 87086 URINE CULTURE/COLONY COUNT: CPT | Performed by: EMERGENCY MEDICINE

## 2021-09-15 PROCEDURE — 81001 URINALYSIS AUTO W/SCOPE: CPT | Performed by: EMERGENCY MEDICINE

## 2021-09-15 RX ORDER — HYDROMORPHONE HCL IN WATER/PF 6 MG/30 ML
0.2 PATIENT CONTROLLED ANALGESIA SYRINGE INTRAVENOUS
Status: COMPLETED | OUTPATIENT
Start: 2021-09-15 | End: 2021-09-15

## 2021-09-15 RX ORDER — SODIUM CHLORIDE 9 MG/ML
INJECTION, SOLUTION INTRAVENOUS CONTINUOUS
Status: DISCONTINUED | OUTPATIENT
Start: 2021-09-15 | End: 2021-09-16

## 2021-09-15 RX ORDER — IOPAMIDOL 755 MG/ML
500 INJECTION, SOLUTION INTRAVASCULAR ONCE
Status: COMPLETED | OUTPATIENT
Start: 2021-09-15 | End: 2021-09-15

## 2021-09-15 RX ORDER — HYDROMORPHONE HYDROCHLORIDE 1 MG/ML
0.5 INJECTION, SOLUTION INTRAMUSCULAR; INTRAVENOUS; SUBCUTANEOUS
Status: COMPLETED | OUTPATIENT
Start: 2021-09-15 | End: 2021-09-15

## 2021-09-15 RX ORDER — CEFTRIAXONE 1 G/1
1 INJECTION, POWDER, FOR SOLUTION INTRAMUSCULAR; INTRAVENOUS ONCE
Status: COMPLETED | OUTPATIENT
Start: 2021-09-15 | End: 2021-09-15

## 2021-09-15 RX ADMIN — SODIUM CHLORIDE 1000 ML: 9 INJECTION, SOLUTION INTRAVENOUS at 20:49

## 2021-09-15 RX ADMIN — CEFTRIAXONE 1 G: 1 INJECTION, POWDER, FOR SOLUTION INTRAMUSCULAR; INTRAVENOUS at 22:35

## 2021-09-15 RX ADMIN — IOPAMIDOL 80 ML: 755 INJECTION, SOLUTION INTRAVENOUS at 21:29

## 2021-09-15 RX ADMIN — SODIUM CHLORIDE 60 ML: 9 INJECTION, SOLUTION INTRAVENOUS at 21:30

## 2021-09-15 RX ADMIN — HYDROMORPHONE HYDROCHLORIDE 0.5 MG: 1 INJECTION, SOLUTION INTRAMUSCULAR; INTRAVENOUS; SUBCUTANEOUS at 22:50

## 2021-09-15 RX ADMIN — HYDROMORPHONE HYDROCHLORIDE 0.2 MG: 0.2 INJECTION, SOLUTION INTRAMUSCULAR; INTRAVENOUS; SUBCUTANEOUS at 20:48

## 2021-09-16 ENCOUNTER — APPOINTMENT (OUTPATIENT)
Dept: ULTRASOUND IMAGING | Facility: CLINIC | Age: 71
End: 2021-09-16
Attending: INTERNAL MEDICINE
Payer: COMMERCIAL

## 2021-09-16 LAB
ANION GAP SERPL CALCULATED.3IONS-SCNC: 3 MMOL/L (ref 3–14)
BUN SERPL-MCNC: 15 MG/DL (ref 7–30)
CALCIUM SERPL-MCNC: 7.6 MG/DL (ref 8.5–10.1)
CHLORIDE BLD-SCNC: 111 MMOL/L (ref 94–109)
CO2 SERPL-SCNC: 29 MMOL/L (ref 20–32)
CREAT SERPL-MCNC: 0.8 MG/DL (ref 0.52–1.04)
GFR SERPL CREATININE-BSD FRML MDRD: 75 ML/MIN/1.73M2
GLUCOSE BLD-MCNC: 117 MG/DL (ref 70–99)
GLUCOSE BLDC GLUCOMTR-MCNC: 101 MG/DL (ref 70–99)
GLUCOSE BLDC GLUCOMTR-MCNC: 116 MG/DL (ref 70–99)
GLUCOSE BLDC GLUCOMTR-MCNC: 119 MG/DL (ref 70–99)
GLUCOSE BLDC GLUCOMTR-MCNC: 128 MG/DL (ref 70–99)
GLUCOSE BLDC GLUCOMTR-MCNC: 132 MG/DL (ref 70–99)
GLUCOSE BLDC GLUCOMTR-MCNC: 210 MG/DL (ref 70–99)
GLUCOSE BLDC GLUCOMTR-MCNC: 245 MG/DL (ref 70–99)
HBA1C MFR BLD: 7.5 % (ref 0–5.6)
POTASSIUM BLD-SCNC: 3.4 MMOL/L (ref 3.4–5.3)
SODIUM SERPL-SCNC: 143 MMOL/L (ref 133–144)

## 2021-09-16 PROCEDURE — 250N000013 HC RX MED GY IP 250 OP 250 PS 637: Performed by: INTERNAL MEDICINE

## 2021-09-16 PROCEDURE — 250N000011 HC RX IP 250 OP 636: Performed by: INTERNAL MEDICINE

## 2021-09-16 PROCEDURE — 250N000013 HC RX MED GY IP 250 OP 250 PS 637: Performed by: STUDENT IN AN ORGANIZED HEALTH CARE EDUCATION/TRAINING PROGRAM

## 2021-09-16 PROCEDURE — 80048 BASIC METABOLIC PNL TOTAL CA: CPT | Performed by: INTERNAL MEDICINE

## 2021-09-16 PROCEDURE — 96372 THER/PROPH/DIAG INJ SC/IM: CPT | Performed by: INTERNAL MEDICINE

## 2021-09-16 PROCEDURE — 76830 TRANSVAGINAL US NON-OB: CPT

## 2021-09-16 PROCEDURE — 36415 COLL VENOUS BLD VENIPUNCTURE: CPT | Performed by: INTERNAL MEDICINE

## 2021-09-16 PROCEDURE — 258N000003 HC RX IP 258 OP 636: Performed by: INTERNAL MEDICINE

## 2021-09-16 PROCEDURE — 96376 TX/PRO/DX INJ SAME DRUG ADON: CPT

## 2021-09-16 PROCEDURE — 99221 1ST HOSP IP/OBS SF/LOW 40: CPT | Performed by: STUDENT IN AN ORGANIZED HEALTH CARE EDUCATION/TRAINING PROGRAM

## 2021-09-16 PROCEDURE — 99207 PR CDG-CODE CATEGORY CHANGED: CPT | Performed by: INTERNAL MEDICINE

## 2021-09-16 PROCEDURE — 250N000013 HC RX MED GY IP 250 OP 250 PS 637: Mod: GY | Performed by: INTERNAL MEDICINE

## 2021-09-16 PROCEDURE — 250N000012 HC RX MED GY IP 250 OP 636 PS 637: Performed by: INTERNAL MEDICINE

## 2021-09-16 PROCEDURE — G0378 HOSPITAL OBSERVATION PER HR: HCPCS

## 2021-09-16 PROCEDURE — 99220 PR INITIAL OBSERVATION CARE,LEVEL III: CPT | Performed by: INTERNAL MEDICINE

## 2021-09-16 RX ORDER — ONDANSETRON 4 MG/1
4 TABLET, ORALLY DISINTEGRATING ORAL EVERY 6 HOURS PRN
Status: DISCONTINUED | OUTPATIENT
Start: 2021-09-16 | End: 2021-09-17 | Stop reason: HOSPADM

## 2021-09-16 RX ORDER — DEXTROSE MONOHYDRATE 25 G/50ML
25-50 INJECTION, SOLUTION INTRAVENOUS
Status: DISCONTINUED | OUTPATIENT
Start: 2021-09-16 | End: 2021-09-17 | Stop reason: HOSPADM

## 2021-09-16 RX ORDER — NALOXONE HYDROCHLORIDE 0.4 MG/ML
0.4 INJECTION, SOLUTION INTRAMUSCULAR; INTRAVENOUS; SUBCUTANEOUS
Status: DISCONTINUED | OUTPATIENT
Start: 2021-09-16 | End: 2021-09-17 | Stop reason: HOSPADM

## 2021-09-16 RX ORDER — NICOTINE POLACRILEX 4 MG
15-30 LOZENGE BUCCAL
Status: DISCONTINUED | OUTPATIENT
Start: 2021-09-16 | End: 2021-09-17 | Stop reason: HOSPADM

## 2021-09-16 RX ORDER — LISINOPRIL 5 MG/1
5 TABLET ORAL DAILY
COMMUNITY

## 2021-09-16 RX ORDER — BACLOFEN 10 MG/1
10 TABLET ORAL 2 TIMES DAILY
Status: DISCONTINUED | OUTPATIENT
Start: 2021-09-16 | End: 2021-09-17 | Stop reason: HOSPADM

## 2021-09-16 RX ORDER — IBUPROFEN 200 MG
200-800 TABLET ORAL EVERY 8 HOURS PRN
Status: ON HOLD | COMMUNITY
End: 2023-07-30

## 2021-09-16 RX ORDER — CEFTRIAXONE 1 G/1
1 INJECTION, POWDER, FOR SOLUTION INTRAMUSCULAR; INTRAVENOUS DAILY
Status: DISCONTINUED | OUTPATIENT
Start: 2021-09-16 | End: 2021-09-17 | Stop reason: HOSPADM

## 2021-09-16 RX ORDER — ONDANSETRON 2 MG/ML
4 INJECTION INTRAMUSCULAR; INTRAVENOUS EVERY 6 HOURS PRN
Status: DISCONTINUED | OUTPATIENT
Start: 2021-09-16 | End: 2021-09-17 | Stop reason: HOSPADM

## 2021-09-16 RX ORDER — LISINOPRIL 5 MG/1
5 TABLET ORAL DAILY
Status: DISCONTINUED | OUTPATIENT
Start: 2021-09-16 | End: 2021-09-17 | Stop reason: HOSPADM

## 2021-09-16 RX ORDER — METFORMIN HCL 500 MG
1000 TABLET, EXTENDED RELEASE 24 HR ORAL 2 TIMES DAILY WITH MEALS
COMMUNITY

## 2021-09-16 RX ORDER — HYDROMORPHONE HYDROCHLORIDE 1 MG/ML
0.3 INJECTION, SOLUTION INTRAMUSCULAR; INTRAVENOUS; SUBCUTANEOUS
Status: DISCONTINUED | OUTPATIENT
Start: 2021-09-16 | End: 2021-09-17 | Stop reason: HOSPADM

## 2021-09-16 RX ORDER — TRAZODONE HYDROCHLORIDE 50 MG/1
50 TABLET, FILM COATED ORAL AT BEDTIME
COMMUNITY

## 2021-09-16 RX ORDER — PRAMIPEXOLE DIHYDROCHLORIDE 1 MG/1
1 TABLET ORAL 2 TIMES DAILY
COMMUNITY

## 2021-09-16 RX ORDER — NALOXONE HYDROCHLORIDE 0.4 MG/ML
0.2 INJECTION, SOLUTION INTRAMUSCULAR; INTRAVENOUS; SUBCUTANEOUS
Status: DISCONTINUED | OUTPATIENT
Start: 2021-09-16 | End: 2021-09-17 | Stop reason: HOSPADM

## 2021-09-16 RX ORDER — TRAZODONE HYDROCHLORIDE 50 MG/1
50 TABLET, FILM COATED ORAL AT BEDTIME
Status: DISCONTINUED | OUTPATIENT
Start: 2021-09-16 | End: 2021-09-17 | Stop reason: HOSPADM

## 2021-09-16 RX ORDER — SIMVASTATIN 40 MG
40 TABLET ORAL DAILY
Status: DISCONTINUED | OUTPATIENT
Start: 2021-09-16 | End: 2021-09-16

## 2021-09-16 RX ORDER — BACLOFEN 10 MG/1
10 TABLET ORAL 2 TIMES DAILY
Status: ON HOLD | COMMUNITY
End: 2023-03-02

## 2021-09-16 RX ORDER — PRAMIPEXOLE DIHYDROCHLORIDE 1 MG/1
1 TABLET ORAL 2 TIMES DAILY
Status: DISCONTINUED | OUTPATIENT
Start: 2021-09-16 | End: 2021-09-16

## 2021-09-16 RX ORDER — SODIUM CHLORIDE 9 MG/ML
INJECTION, SOLUTION INTRAVENOUS CONTINUOUS
Status: DISCONTINUED | OUTPATIENT
Start: 2021-09-16 | End: 2021-09-17

## 2021-09-16 RX ORDER — TAMSULOSIN HYDROCHLORIDE 0.4 MG/1
0.4 CAPSULE ORAL DAILY
Status: DISCONTINUED | OUTPATIENT
Start: 2021-09-16 | End: 2021-09-17 | Stop reason: HOSPADM

## 2021-09-16 RX ORDER — PRAMIPEXOLE DIHYDROCHLORIDE 1 MG/1
1 TABLET ORAL EVERY MORNING
Status: DISCONTINUED | OUTPATIENT
Start: 2021-09-16 | End: 2021-09-16

## 2021-09-16 RX ORDER — PRAMIPEXOLE DIHYDROCHLORIDE 1 MG/1
1 TABLET ORAL 2 TIMES DAILY
Status: DISCONTINUED | OUTPATIENT
Start: 2021-09-16 | End: 2021-09-17 | Stop reason: HOSPADM

## 2021-09-16 RX ORDER — LIDOCAINE 40 MG/G
CREAM TOPICAL
Status: DISCONTINUED | OUTPATIENT
Start: 2021-09-16 | End: 2021-09-17 | Stop reason: HOSPADM

## 2021-09-16 RX ADMIN — SERTRALINE HYDROCHLORIDE 50 MG: 50 TABLET ORAL at 08:46

## 2021-09-16 RX ADMIN — TRAZODONE HYDROCHLORIDE 50 MG: 50 TABLET ORAL at 01:38

## 2021-09-16 RX ADMIN — BACLOFEN 10 MG: 10 TABLET ORAL at 09:45

## 2021-09-16 RX ADMIN — HYDROMORPHONE HYDROCHLORIDE 0.3 MG: 1 INJECTION, SOLUTION INTRAMUSCULAR; INTRAVENOUS; SUBCUTANEOUS at 05:37

## 2021-09-16 RX ADMIN — SODIUM CHLORIDE: 9 INJECTION, SOLUTION INTRAVENOUS at 01:39

## 2021-09-16 RX ADMIN — LISINOPRIL 5 MG: 5 TABLET ORAL at 09:45

## 2021-09-16 RX ADMIN — OMEPRAZOLE 20 MG: 20 CAPSULE, DELAYED RELEASE ORAL at 21:19

## 2021-09-16 RX ADMIN — PRAMIPEXOLE DIHYDROCHLORIDE 1 MG: 1 TABLET ORAL at 09:45

## 2021-09-16 RX ADMIN — BACLOFEN 10 MG: 10 TABLET ORAL at 21:19

## 2021-09-16 RX ADMIN — TRAZODONE HYDROCHLORIDE 50 MG: 50 TABLET ORAL at 21:20

## 2021-09-16 RX ADMIN — INSULIN GLARGINE 23 UNITS: 100 INJECTION, SOLUTION SUBCUTANEOUS at 21:27

## 2021-09-16 RX ADMIN — HYDROMORPHONE HYDROCHLORIDE 0.3 MG: 1 INJECTION, SOLUTION INTRAMUSCULAR; INTRAVENOUS; SUBCUTANEOUS at 17:45

## 2021-09-16 RX ADMIN — TAMSULOSIN HYDROCHLORIDE 0.4 MG: 0.4 CAPSULE ORAL at 08:46

## 2021-09-16 RX ADMIN — PRAMIPEXOLE DIHYDROCHLORIDE 1 MG: 1 TABLET ORAL at 21:43

## 2021-09-16 RX ADMIN — OMEPRAZOLE 20 MG: 20 CAPSULE, DELAYED RELEASE ORAL at 10:33

## 2021-09-16 RX ADMIN — CEFTRIAXONE 1 G: 1 INJECTION, POWDER, FOR SOLUTION INTRAMUSCULAR; INTRAVENOUS at 21:21

## 2021-09-16 RX ADMIN — HYDROMORPHONE HYDROCHLORIDE 0.3 MG: 1 INJECTION, SOLUTION INTRAMUSCULAR; INTRAVENOUS; SUBCUTANEOUS at 01:45

## 2021-09-16 RX ADMIN — INSULIN GLARGINE 23 UNITS: 100 INJECTION, SOLUTION SUBCUTANEOUS at 01:39

## 2021-09-16 NOTE — ED NOTES
Pt assisted to restroom in wheelchair. Pt able to transfer self in and out of wheelchair independently.

## 2021-09-16 NOTE — ED PROVIDER NOTES
History   Chief Complaint:  Flank Pain       The history is provided by the patient.      Charlette Sharp is a 71 year old female with history of diabetes, diverticulitis, and bowel obstruction who presents with left side and flank pain rated 8/10 in severity. She reports pain beginning three weeks ago, with worse pain today after eating. There are no other exacerbating or alleviating factors. The patient also notes recent diarrhea over her baseline, as well as some nausea beginning a couple days ago and becoming worse today. She reports that her pain is similar to when she had diverticulitis in the past. The patient had some food today and notes that she has not checked her sugars very often. The patient denies dysuria, hematuria, urinary frequency, vomiting, rash, fever, chest pain, shortness of breath, cough, or rhinorrhea. She also denies cardiac history and hospitalization or surgery for her diverticulitis.     Review of Systems   All other systems reviewed and are negative.      Allergies:  The patient has no known allergies.     Medications:  Aspirin  Calmag   Vitamin B12  ampyra  Ferrous sulfate  Insulin glargine  Metformin  Omeprazole   Mirapex  Sertraline  Zocor  Trazodone   Vitamin D    Past Medical History:    Anemia  B12 deficiency  Chronic diarrhea  Depressive disorder  Diabetes  Diverticulitis  GERD  Hypercholesterolemia   Hyperlipidemia  Hypertension  Insomnia  Kidney stone  Multiple sclerosis  Pancreatic pseudocyst  Pancreatitis  Restless leg  Renal mass  Small bowel obstruction  Iron deficiency    Past Surgical History:    Cholecystectomy   Colonoscopy  Appendectomy  Laparoscopic cryoablation tumor kidney  Laser holmium lithotripsy ureter(s), insert stent  Nephrectomy partial     Family History:    Hypertension  Hyperlipidemia    Social History:  The patient presents with her , Valerio.    Physical Exam     Patient Vitals for the past 24 hrs:   BP Temp Temp src Pulse Resp SpO2 Weight    09/15/21 2303 -- -- -- -- -- 95 % --   09/15/21 2300 -- -- -- -- -- 93 % --   09/15/21 2209 -- -- -- -- -- 97 % --   09/15/21 2124 -- -- -- -- -- 91 % --   09/15/21 2115 137/80 -- -- 74 -- 97 % --   09/15/21 2100 126/79 -- -- 73 -- 97 % --   09/15/21 2050 (!) 140/91 -- -- 74 -- 98 % --   09/15/21 1850 (!) 142/91 98.3  F (36.8  C) Temporal 73 18 99 % 72.6 kg (160 lb)       Physical Exam  General: Resting on the bed.  Head: No obvious trauma to head.  Ears, Nose, Throat:  External ears normal.  Nose normal.    Eyes:  Conjunctivae clear.  Pupils are equal, round, and reactive.   Neck: Normal range of motion.  Neck supple.   CV: Regular rate and rhythm.  No murmurs.      Respiratory: Effort normal and breath sounds normal.  No wheezing or crackles.   Gastrointestinal: Soft.  No distension. There is LLQ tenderness.  There is no rigidity, no rebound and no guarding.   Musculoskeletal: right cva tenderness  Neuro: Alert. Moving all extremities appropriately.  Normal speech.    Skin: Skin is warm and dry.  No rash noted.     Emergency Department Course     Imaging:    CT Abdomen Pelvis w Contrast    1.  There is colonic diverticulosis. No diverticulitis.  2.  Small staghorn calculus in the left kidney. 4 mm calculus at the right ureteropelvic junction. Otherwise there are small nonobstructing renal calculi. No significant hydronephrosis or hydroureter.  3.  A 13 mm hypoattenuating focus at the lower pole of the right kidney corresponds with a renal mass on a comparison study from 2013 and could be related to partial nephrectomy.  4.  14 mm left ovarian lesion. Ultrasound should be considered, though this is most likely benign.  As per radiology.     Laboratory:     CBC: WBC 9.9, HGB 11.0 (L),    CMP: Calcium 8.1 (L), Glucose 172 (H), Protein Total 6.7 (L), GFR 55 (L) o/w WNL (Creatinine 1.04)     UA with microscopic: color orange, appearance cloudy, ketones 10, blood large, protein albumin 100, leukocyte esterase  small, yeast few, mucus present, RBC > 182 (H), WBC 37 (H) o/w WNL   Lipase: 69 (L)    Lactic acid (result time ) 2.9 (H)   Lactic acid (result time ) 1.0      Creatinine POCT: Creatinine 1.1 (H), GFR 51 (L)  Asymptomatic COVID19 Virus PCR by nasopharyngeal swab: Negative    Blood cultures pending x2  Urine culture pending     Emergency Department Course:    Reviewed:  I reviewed nursing notes, vitals, past medical history and care everywhere    Assessments:   I obtained history and examined the patient as noted above.    I rechecked the patient and explained findings. We discussed admission at this time.    Consults:    I consulted with Dr. Ruggiero, hospitalist, regarding the patient's history and presentation here in the emergency department who accepted the patient for admission.     Interventions:   Dilaudid 0.2 mg IV   NS 1 L IV   Rocephin 1 g IV   Dilaudid 0.5 mg IV    Disposition:  The patient was admitted to the hospital under the care of Dr. Ruggiero.       Impression & Plan   CMS Diagnoses: The Lactic acid level is elevated due to dehydration, poor PO intake, at this time there is no sign of severe sepsis or septic shock. and None  Medical Decision Makin-year-old female with history of prior kidney stones presents with flank pain.  Vital signs are reassuring.  Broad differential was pursued including not limited to bowel perforation, obstruction, diverticulitis, colitis, nephrolithiasis, infected stone, pancreatitis, hepatitis, etc.  CBC shows no leukocytosis and mild anemia.  Lactate is minimally elevated 2.9 resolved with fluid hydration.  Given that patient is afebrile, normal white count, low suspicion for severe sepsis or septic shock.  Suspect lactic is likely related to poor p.o. intake and dehydration.  BMP shows no acute electrolyte, metabolic or renal dysfunction.  Minimally elevated glucose but no signs of DKA.  LFTs are reassuring.  Lipase reassuring.  No  signs of pancreatitis, hepatitis, etc.  UA does show cloudy with small leuk esterase and blood.  CT was obtained showing UPJ stone in addition to a staghorn stone on the left.  Paged for urology consultation.  Ceftriaxone given for mildly abnormal urine analysis and elevated lactate.  Blood cultures obtained and pending.  Covid negative.  Plan for admission for IV fluids and urology consultation.  Discussed with hospitalist who graciously accepted.        Diagnosis:    ICD-10-CM    1. UPJ (ureteropelvic junction) obstruction  N13.5        Scribe Disclosure:  I, Juana Earl, am serving as a scribe at 8:33 PM on 9/15/2021 to document services personally performed by Jodi Corona MD based on my observations and the provider's statements to me.        Jodi Corona MD  09/16/21 0007

## 2021-09-16 NOTE — CONSULTS
Urology Consult    Name:  Charlette Sharp  MRN:  [id number]  Age/: 71 year old, 1950    CC: left Flank Pain. History of renal calculi.    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  She was currently admitted to the ER with left flank pain which which was getting worse over the last few hours. She is better now after the analgesics.  No h/o fever, chills, dysuria or hematuria.  She has been seen by urologist in the past and is scheduled to follow-up for her renal stones.      Past Medical History:  Past Medical History:   Diagnosis Date     Anemia      B12 deficiency      Chronic diarrhea      Depressive disorder      Diabetes mellitus (H)      Diverticulitis      Gastro-oesophageal reflux disease      Hypercholesteraemia      Hyperlipidemia      Hypertension      Insomnia      Kidney stone      MS (multiple sclerosis) (H)     generalized weakness     Pancreatic pseudocyst      Pancreatitis      Restless legs        Past Surgical History:  Past Surgical History:   Procedure Laterality Date     APPENDECTOMY       CHOLECYSTECTOMY       COLONOSCOPY       LAPAROSCOPIC CRYOABLATION TUMOR KIDNEY  2013    Procedure: LAPAROSCOPIC CRYOABLATION TUMOR KIDNEY;;  Surgeon: Elijah Corona MD;  Location:  OR     LASER HOLMIUM LITHOTRIPSY URETER(S), INSERT STENT, COMBINED Left 2020    Procedure: CYSTOSCOPY, LEFT RETROGRADE, LEFT URETEROSCOPY, WITH LITHOTRIPSY USING LASER, STONE BASKETING, LEFT URETERAL STENT INSERTION;  Surgeon: Juan Luis Nagy MD;  Location:  OR     NEPHRECTOMY PARTIAL  2013    Procedure: NEPHRECTOMY PARTIAL;  LAPAROSCOPIC RIGHT RENAL CRYOABLATION;  Surgeon: Elijah Corona MD;  Location:  OR       Allergies:   No Known Allergies    Medications:  No current facility-administered medications on file prior to encounter.  Ascorbic Acid (VITAMIN C  PO), Take 1,000 mg by mouth daily   ASPIRIN PO, Take 81 mg by mouth daily  baclofen (LIORESAL) 10 MG tablet, Take 10 mg by mouth 2 times daily  BIOTIN PO, Take 1 tablet by mouth 2 times daily  calcium-magnesium (CALMAG) 500-250 MG TABS, Take 1 tablet by mouth daily  Cyanocobalamin (VITAMIN B 12 PO), Take 1,000 mcg by mouth Every Mon, Wed, Fri Morning   ibuprofen (ADVIL/MOTRIN) 200 MG tablet, Take 200-800 mg by mouth every 8 hours as needed for mild pain  insulin glargine (LANTUS PEN) 100 UNIT/ML pen, Inject 23 Units Subcutaneous At Bedtime   lisinopril (ZESTRIL) 5 MG tablet, Take 5 mg by mouth daily  metFORMIN (GLUCOPHAGE-XR) 500 MG 24 hr tablet, Take 1,000 mg by mouth 2 times daily (with meals)  omeprazole (PRILOSEC) 20 MG DR capsule, Take 20 mg by mouth 2 times daily  pramipexole (MIRAPEX) 1 MG tablet, Take 1 mg by mouth every morning  SERTRALINE HCL PO, Take 50 mg by mouth daily  traZODone (DESYREL) 50 MG tablet, Take 50 mg by mouth At Bedtime  VITAMIN D, CHOLECALCIFEROL, PO, Take 2,000 Units by mouth daily   ACCU-CHEK ESPERAZNA, None Entered          Family History:  No family history on file.    ROS:  The remainder of the complete ROS was negative unless noted in the HPI.    Exam:  /68 (BP Location: Right arm)   Pulse 61   Temp 98  F (36.7  C) (Oral)   Resp 18   Wt 77.3 kg (170 lb 8 oz)   SpO2 97%   BMI 32.22 kg/m    General: Alert, interactive, & in NAD  Resp: CTAB, no crackles or wheezes  Cardiac: Regular rate; extremities warm;   Abdomen: Soft, nontender, nondistended. .  : Normal   Extremities: No LE edema or obvious joint abnormalities  Skin: Warm and dry, no jaundice or rash    Labs:  Results for orders placed or performed during the hospital encounter of 09/15/21 (from the past 24 hour(s))   UA with Microscopic reflex to Culture    Specimen: Urine, Midstream   Result Value Ref Range    Color Urine Orange (A) Colorless, Straw, Light Yellow, Yellow    Appearance Urine Cloudy (A) Clear    Glucose  Urine Negative Negative mg/dL    Bilirubin Urine Negative Negative    Ketones Urine 10  (A) Negative mg/dL    Specific Gravity Urine 1.028 1.003 - 1.035    Blood Urine Large (A) Negative    pH Urine 5.5 5.0 - 7.0    Protein Albumin Urine 100  (A) Negative mg/dL    Urobilinogen Urine Normal Normal, 2.0 mg/dL    Nitrite Urine Negative Negative    Leukocyte Esterase Urine Small (A) Negative    Budding Yeast Urine Few (A) None Seen /HPF    Mucus Urine Present (A) None Seen /LPF    RBC Urine >182 (H) <=2 /HPF    WBC Urine 37 (H) <=5 /HPF    Squamous Epithelials Urine 1 <=1 /HPF    Narrative    Urine Culture ordered based on laboratory criteria   CBC with platelets differential    Narrative    The following orders were created for panel order CBC with platelets differential.  Procedure                               Abnormality         Status                     ---------                               -----------         ------                     CBC with platelets and d...[[id number]]  Abnormal            Final result                 Please view results for these tests on the individual orders.   Comprehensive metabolic panel   Result Value Ref Range    Sodium 142 133 - 144 mmol/L    Potassium 4.0 3.4 - 5.3 mmol/L    Chloride 108 94 - 109 mmol/L    Carbon Dioxide (CO2) 27 20 - 32 mmol/L    Anion Gap 7 3 - 14 mmol/L    Urea Nitrogen 15 7 - 30 mg/dL    Creatinine 1.04 0.52 - 1.04 mg/dL    Calcium 8.1 (L) 8.5 - 10.1 mg/dL    Glucose 172 (H) 70 - 99 mg/dL    Alkaline Phosphatase 78 40 - 150 U/L    AST 12 0 - 45 U/L    ALT 21 0 - 50 U/L    Protein Total 6.7 (L) 6.8 - 8.8 g/dL    Albumin 3.4 3.4 - 5.0 g/dL    Bilirubin Total 0.5 0.2 - 1.3 mg/dL    GFR Estimate 55 (L) >60 mL/min/1.73m2   Lipase   Result Value Ref Range    Lipase 69 (L) 73 - 393 U/L   Lactic acid whole blood   Result Value Ref Range    Lactic Acid 2.9 (H) 0.7 - 2.0 mmol/L   Delta Draw    Narrative    The following orders were created for panel order Delta  Draw.  Procedure                               Abnormality         Status                     ---------                               -----------         ------                     Extra Red Top Tube[[id number]]                               Final result               Extra Green Top (Lithium...[[id number]]                      Final result                 Please view results for these tests on the individual orders.   CBC with platelets and differential   Result Value Ref Range    WBC Count 9.9 4.0 - 11.0 10e3/uL    RBC Count 4.17 3.80 - 5.20 10e6/uL    Hemoglobin 11.0 (L) 11.7 - 15.7 g/dL    Hematocrit 35.8 35.0 - 47.0 %    MCV 86 78 - 100 fL    MCH 26.4 (L) 26.5 - 33.0 pg    MCHC 30.7 (L) 31.5 - 36.5 g/dL    RDW 13.5 10.0 - 15.0 %    Platelet Count 244 150 - 450 10e3/uL    % Neutrophils 75 %    % Lymphocytes 17 %    % Monocytes 5 %    % Eosinophils 3 %    % Basophils 0 %    % Immature Granulocytes 0 %    NRBCs per 100 WBC 0 <1 /100    Absolute Neutrophils 7.3 1.6 - 8.3 10e3/uL    Absolute Lymphocytes 1.7 0.8 - 5.3 10e3/uL    Absolute Monocytes 0.5 0.0 - 1.3 10e3/uL    Absolute Eosinophils 0.3 0.0 - 0.7 10e3/uL    Absolute Basophils 0.0 0.0 - 0.2 10e3/uL    Absolute Immature Granulocytes 0.0 <=0.0 10e3/uL    Absolute NRBCs 0.0 10e3/uL   Extra Red Top Tube   Result Value Ref Range    Hold Specimen JIC    Extra Green Top (Lithium Heparin) Tube   Result Value Ref Range    Hold Specimen JIC    Hemoglobin A1c   Result Value Ref Range    Hemoglobin A1C 7.5 (H) 0.0 - 5.6 %   Creatinine POCT   Result Value Ref Range    Creatinine POCT 1.1 (H) 0.5 - 1.0 mg/dL    GFR, ESTIMATED POCT 51 (L) >60 mL/min/1.73m2   CT Abdomen Pelvis w Contrast    Narrative    EXAM: CT ABDOMEN PELVIS W CONTRAST  LOCATION: [facility]  DATE/TIME: 9/15/2021 9:25 PM    INDICATION: Diverticulitis suspected Diverticulitis, complication suspected. Left lateral side/flank pain to left lower quadrant abdominal pain with nausea and  intermittent vomiting. Diarrhea.  COMPARISON: 6/17/2015 and 5/8/2015. 09/12/2013 CT study.  TECHNIQUE: CT scan of the abdomen and pelvis was performed following injection of IV contrast. Multiplanar reformats were obtained. Dose reduction techniques were used.  CONTRAST: 80mL Isovue-370    FINDINGS:   LOWER CHEST: Small hiatal hernia.     HEPATOBILIARY: Cholecystectomy.     PANCREAS: Much of the pancreas is atrophic.     SPLEEN: Normal.    ADRENAL GLANDS: Normal.    KIDNEYS/BLADDER: There is a 4 mm x 3 mm x 3 mm calculus at the right ureteropelvic junction. There is a nonobstructing 1 to 2 mm calculus at the lower pole of the right kidney. There is a linear and branching calcification in the left kidney and renal   pelvis measuring 28 mm x 5 mm x 3 mm. A few other nonobstructing calculi are seen in the left kidney. No left ureteral calculi. No hydronephrosis or hydroureter. Normal urinary bladder.  There is a 13 mm hypoattenuating focus at the lower pole of the   right kidney.    BOWEL: Normal stomach. Normal caliber of the small bowel. There is colonic diverticulosis. No diverticulitis identified.     LYMPH NODES: Normal.    VASCULATURE: Unremarkable.    PELVIC ORGANS: A 1.4 cm low-attenuation left ovarian lesion is noted.     MUSCULOSKELETAL: Normal.      Impression    IMPRESSION:   1.  There is colonic diverticulosis. No diverticulitis.  2.  Small staghorn calculus in the left kidney. 4 mm calculus at the right ureteropelvic junction. Otherwise there are small nonobstructing renal calculi. No significant hydronephrosis or hydroureter.  3.  A 13 mm hypoattenuating focus at the lower pole of the right kidney corresponds with a renal mass on a comparison study from 2013 and could be related to partial nephrectomy.  4.  14 mm left ovarian lesion. Ultrasound should be considered, though this is most likely benign.    REFERENCE:  Management of Incidental Adnexal Findings on CT and MRI: A White Paper of the ACR  Incidental Findings Committee. J Am Cristy Radiol 2020; 17(2):248-254.   Asymptomatic COVID-19 Virus (Coronavirus) by PCR Nasopharyngeal    Specimen: Nasopharyngeal; Swab   Result Value Ref Range    SARS CoV2 PCR Negative Negative    Narrative    Testing was performed using the yodit  SARS-CoV-2 & Influenza A/B Assay on the yodit  Gil[employer]  System.  This test should be ordered for the detection of SARS-COV-2 in individuals who meet SARS-CoV-2 clinical and/or epidemiological criteria. Test performance is unknown in asymptomatic patients.  This test is for in vitro diagnostic use under the FDA EUA for laboratories certified under CLIA to perform moderate and/or high complexity testing. This test has not been FDA cleared or approved.  A negative test does not rule out the presence of PCR inhibitors in the specimen or target RNA in concentration below the limit of detection for the assay. The possibility of a false negative should be considered if the patient's recent exposure or clinical presentation suggests COVID-19.  [facility] are certified under the Clinical Laboratory Improvement Amendments of 1988 (CLIA-88) as qualified to perform moderate and/or high complexity laboratory testing.   Lactic acid whole blood   Result Value Ref Range    Lactic Acid 1.0 0.7 - 2.0 mmol/L   Glucose by meter   Result Value Ref Range    GLUCOSE BY METER POCT 119 (H) 70 - 99 mg/dL   Glucose by meter   Result Value Ref Range    GLUCOSE BY METER POCT 101 (H) 70 - 99 mg/dL   Basic metabolic panel   Result Value Ref Range    Sodium 143 133 - 144 mmol/L    Potassium 3.4 3.4 - 5.3 mmol/L    Chloride 111 (H) 94 - 109 mmol/L    Carbon Dioxide (CO2) 29 20 - 32 mmol/L    Anion Gap 3 3 - 14 mmol/L    Urea Nitrogen 15 7 - 30 mg/dL    Creatinine 0.80 0.52 - 1.04 mg/dL    Calcium 7.6 (L) 8.5 - 10.1 mg/dL    Glucose 117 (H) 70 - 99 mg/dL    GFR Estimate 75 >60 mL/min/1.73m2   Glucose by meter   Result Value Ref Range    GLUCOSE BY METER  POCT 116 (H) 70 - 99 mg/dL               Assessment and Plan: Charlette Sharp is a(n) 71 year old female with bilateral renal calculi, nonobstructing.  Currently pain under control and being managed with the hospitalist service under observation.  She also has a history of renal cryoablation on the right side for renal tumor.  Plan:  1.  No immediate need for intervention for the renal calculi in the absence of obstruction, normal counts and normal renal function and pain being under control.  2.  Start on Flomax 0.4 mg once daily to continue.  IV antibiotics as per hospitalist service to be tailored based on culture results.    3.  Right lower pole lesion appears stable as a sequelae of prior cryoablation.  Yearly CT for surveillance.  4.  She is scheduled to follow-up with a outside urologist in the next few days.  Further care for the renal calculi can be followed up at the same visit.  We will continue to follow-up peripherally during her stay here.    Young Arriaza MD  [facility]

## 2021-09-16 NOTE — PROGRESS NOTES
PRIMARY DIAGNOSIS: ACUTE PAIN  OUTPATIENT/OBSERVATION GOALS TO BE MET BEFORE DISCHARGE:  1. Pain Status: Improved but still requiring IV narcotics.    2. Return to near baseline physical activity: Yes    3. Cleared for discharge by consultants (if involved): No    Discharge Planner Nurse   Safe discharge environment identified: Yes  Barriers to discharge: No       Entered by: Saskia Piper 09/16/2021 5:54 AM     Please review provider order for any additional goals.   Nurse to notify provider when observation goals have been met and patient is ready for discharge.

## 2021-09-16 NOTE — PROGRESS NOTES
PRIMARY DIAGNOSIS: ACUTE PAIN  OUTPATIENT/OBSERVATION GOALS TO BE MET BEFORE DISCHARGE:  1. Pain Status: Improved but still requiring IV narcotics.    2. Return to near baseline physical activity: Yes    3. Cleared for discharge by consultants (if involved): Yes    Discharge Planner Nurse   Safe discharge environment identified: Yes  Barriers to discharge: Yes- awaiting blood culture results        Entered by: Natalie Franco 09/16/2021 6:54 PM     Please review provider order for any additional goals.   Nurse to notify provider when observation goals have been met and patient is ready for discharge.

## 2021-09-16 NOTE — H&P
[facility] History and Physical    Charlette Sharp MRN# [id number]   Age: 71 year old YOB: 1950     Date of Admission:  9/15/2021    [facility]  Primary care provider: Marysol Tamayo          Assessment and Plan:   Assessment:   Charlette Sharp is a 71-year-old woman with a history of significant bilateral kidney stones who came to attention today with left lower quadrant pain.  While in the emergency department, she was noted to have an abnormal urinalysis and CT scan that showed bilateral kidney stones, though neither with evidence of obstruction.  I was asked to admit the patient with urinary tract infection associated with kidney stone.    The emergency department, Ms. Sharp was notably uncomfortable with left lower quadrant pain.  She tells me that this has been going on intermittently for the last 6 months and she is seen multiple providers for it.  Nobody has been able to tell her what the problem is despite multiple imaging studies.  Vital signs are stable and she is afebrile.  Labs: WBC 9.9 with a normal differential, Hgb 11.0, .  CT abdomen/pelvis: Left staghorn calculus, 4 mm right UPJ calculus with no documented hydronephrosis or hydroureter.  Evidence of a partial nephrectomy from the remote past.  In addition, left ovarian lesion 14 mm is described.    Dx:  1.  Left lower quadrant pain.  This may be due to the left ovarian lesion that is not adequately characterized by CT scan.  2.  Right UPJ stone, left staghorn calculus.  No evidence of hydronephrosis or hydroureter.  3.  Mild UTI suspected.   4.  IDDM, type 2.      Plan:   1.  Admit to observation.  These I had initially thought to admit the patient inpatient but I do not believe she is truly got sepsis with an obstructing stone.  2.  Reasonable to continue with empiric ceftriaxone.  3.  Urology consultation is requested.  4.  Pelvic ultrasound should be completed.  I think it  would be best to talk with radiology first though about whether transvaginal approach is essential and whether Doppler is essential.             Chief Complaint:   Left lower quadrant pain.  Patient has had this intermittently for the last 6 months but it is worse tonight.  She also describes episodes of diarrhea with the pain.     History is obtained from the patient, her  who is present at the bedside, emergency room provider and electronic medical record.    As noted, Ms. Sanchez has been experiencing left lower quadrant pain that she describes as crampy intermittently over the course of the last 6 months.  It sounds as though it is more present than not present and she cannot identify relieving or exacerbating factors.  Today, apparently the pain was very intense for which reason she came to the emergency department.  She has been quite frustrated at the work-up to this point noting that nobody has been able to tell her what the source of her pain is.    Patient denies fevers, sweats and chills.  She has been nauseated with the pain but otherwise not had vomiting.  She does not have shortness of breath or cough.  No trouble with her heart.  Denies urinary frequency or change.  No trouble with ambulating or general strength.  She has not had unexpected weight loss.        Past Medical History:     Past Medical History:   Diagnosis Date     Anemia      B12 deficiency      Chronic diarrhea      Depressive disorder      Diabetes mellitus (H)      Diverticulitis      Gastro-oesophageal reflux disease      Hypercholesteraemia      Hyperlipidemia      Hypertension      Insomnia      Kidney stone      MS (multiple sclerosis) (H)     generalized weakness     Pancreatic pseudocyst      Pancreatitis      Restless legs              Past Surgical History:      Past Surgical History:   Procedure Laterality Date     APPENDECTOMY       CHOLECYSTECTOMY       COLONOSCOPY       LAPAROSCOPIC CRYOABLATION TUMOR KIDNEY   11/20/2013    Procedure: LAPAROSCOPIC CRYOABLATION TUMOR KIDNEY;;  Surgeon: Elijah Corona MD;  Location: [facility] OR     LASER HOLMIUM LITHOTRIPSY URETER(S), INSERT STENT, COMBINED Left 8/28/2020    Procedure: CYSTOSCOPY, LEFT RETROGRADE, LEFT URETEROSCOPY, WITH LITHOTRIPSY USING LASER, STONE BASKETING, LEFT URETERAL STENT INSERTION;  Surgeon: Juan Luis Nagy MD;  Location: [facility] OR     NEPHRECTOMY PARTIAL  11/20/2013    Procedure: NEPHRECTOMY PARTIAL;  LAPAROSCOPIC RIGHT RENAL CRYOABLATION;  Surgeon: Elijah Corona MD;  Location: [facility] OR             Social History:     Social History     Tobacco Use     Smoking status: Never Smoker     Smokeless tobacco: Never Used   Substance Use Topics     Alcohol use: Yes     Comment: once weekly             Family History:   Reviewed but considered noncontributory.         Immunizations:   Reports having undergone Covid vaccination         Allergies:   No Known Allergies          Medications:     Medications Prior to Admission   Medication Sig Dispense Refill Last Dose     ACCU-CHEK ESPERANZA None Entered        Ascorbic Acid (VITAMIN C PO) Take 100 mg by mouth daily         ASPIRIN PO Take 81 mg by mouth daily        calcium-magnesium (CALMAG) 500-250 MG TABS Take 1 tablet by mouth daily        Cyanocobalamin (VITAMIN B 12 PO) Take 1,000 mcg by mouth daily         Dalfampridine (AMPYRA PO) Take 10 mg by mouth 2 times daily        Ferrous Sulfate (IRON SUPPLEMENT PO) Take 325 mg by mouth 2 times daily (with meals)         HYDROcodone-acetaminophen (NORCO) 5-325 MG tablet Take 1 tablet by mouth every 4 hours as needed for moderate to severe pain 15 tablet 0      HYDROcodone-acetaminophen (NORCO) 5-325 MG tablet Take 1 tablet by mouth every 6 hours as needed for severe pain        insulin glargine (LANTUS PEN) 100 UNIT/ML pen Inject 23 Units Subcutaneous At Bedtime         METFORMIN HCL PO Take 1,000 mg by mouth 2 times daily (with meals)        OMEPRAZOLE PO  Take 20 mg by mouth daily         Pramipexole Dihydrochloride (MIRAPEX PO) Take 1 mg by mouth 2 times daily         SERTRALINE HCL PO Take 50 mg by mouth daily        Simvastatin (ZOCOR PO) Take 40 mg by mouth daily         TRAZODONE HCL PO Take 50 mg by mouth nightly as needed         VITAMIN D, CHOLECALCIFEROL, PO Take 2,000 Units by mouth daily                 Review of Systems:   A comprehensive review of systems was performed and found to be negative except as described in this note           Physical Exam:   Vitals were reviewed  Temp: 98.1  F (36.7  C) Temp src: Oral BP: 111/66 Pulse: 62   Resp: 16 SpO2: 94 % O2 Device: None (Room air)    Constitutional: Awake, alert, cooperative, no apparent distress, and appears stated age.  Eyes: Lids and lashes normal, pupils equal, round and reactive to light, extra ocular muscles intact, sclera clear, conjunctiva normal.  ENT: Normocephalic, without obvious abnormality, atraumatic.  Neck: Supple, symmetrical, trachea midline, no adenopathy, thyroid symmetric, not enlarged and no tenderness, skin normal.  Hematologic / Lymphatic: No cervical lymphadenopathy and no supraclavicular lymphadenopathy.  Back: Symmetric, no curvature, spinous processes are non-tender on palpation, paraspinous muscles are non-tender on palpation, no costal vertebral tenderness.  Pain is noted in the left sacroiliac area.  Lungs: No increased work of breathing, good air exchange, clear to auscultation bilaterally, no crackles or wheezing.  Cardiovascular: Regular rate and rhythm, normal S1 and S2, no S3 or S4, and no murmur noted.  Abdomen: No scars, normal bowel sounds, soft, non-distended, no masses palpated, no hepatosplenomegaly.  Quite tender in the left suprapubic area.  No rebound or guarding.  Musculoskeletal: No redness, warmth, or swelling of the joints. Tone is normal.  Neurologic: Awake, alert, oriented to name, place and time.  Cranial nerves II-XII are grossly intact.     Neuropsychiatric: Normal affect, mood, orientation.  Skin: No rashes, erythema, pallor, petechia or purpura.          Data:     Results for orders placed or performed during the hospital encounter of 09/15/21 (from the past 24 hour(s))   UA with Microscopic reflex to Culture    Specimen: Urine, Midstream   Result Value Ref Range    Color Urine Orange (A) Colorless, Straw, Light Yellow, Yellow    Appearance Urine Cloudy (A) Clear    Glucose Urine Negative Negative mg/dL    Bilirubin Urine Negative Negative    Ketones Urine 10  (A) Negative mg/dL    Specific Gravity Urine 1.028 1.003 - 1.035    Blood Urine Large (A) Negative    pH Urine 5.5 5.0 - 7.0    Protein Albumin Urine 100  (A) Negative mg/dL    Urobilinogen Urine Normal Normal, 2.0 mg/dL    Nitrite Urine Negative Negative    Leukocyte Esterase Urine Small (A) Negative    Budding Yeast Urine Few (A) None Seen /HPF    Mucus Urine Present (A) None Seen /LPF    RBC Urine >182 (H) <=2 /HPF    WBC Urine 37 (H) <=5 /HPF    Squamous Epithelials Urine 1 <=1 /HPF    Narrative    Urine Culture ordered based on laboratory criteria   CBC with platelets differential    Narrative    The following orders were created for panel order CBC with platelets differential.  Procedure                               Abnormality         Status                     ---------                               -----------         ------                     CBC with platelets and d...[[id number]]  Abnormal            Final result                 Please view results for these tests on the individual orders.   Comprehensive metabolic panel   Result Value Ref Range    Sodium 142 133 - 144 mmol/L    Potassium 4.0 3.4 - 5.3 mmol/L    Chloride 108 94 - 109 mmol/L    Carbon Dioxide (CO2) 27 20 - 32 mmol/L    Anion Gap 7 3 - 14 mmol/L    Urea Nitrogen 15 7 - 30 mg/dL    Creatinine 1.04 0.52 - 1.04 mg/dL    Calcium 8.1 (L) 8.5 - 10.1 mg/dL    Glucose 172 (H) 70 - 99 mg/dL    Alkaline Phosphatase 78 40 -  150 U/L    AST 12 0 - 45 U/L    ALT 21 0 - 50 U/L    Protein Total 6.7 (L) 6.8 - 8.8 g/dL    Albumin 3.4 3.4 - 5.0 g/dL    Bilirubin Total 0.5 0.2 - 1.3 mg/dL    GFR Estimate 55 (L) >60 mL/min/1.73m2   Lipase   Result Value Ref Range    Lipase 69 (L) 73 - 393 U/L   Lactic acid whole blood   Result Value Ref Range    Lactic Acid 2.9 (H) 0.7 - 2.0 mmol/L   [place] Draw    Narrative    The following orders were created for panel order [place] Draw.  Procedure                               Abnormality         Status                     ---------                               -----------         ------                     Extra Red Top Tube[[id number]]                               Final result               Extra Green Top (Lithium...[[id number]]                      Final result                 Please view results for these tests on the individual orders.   CBC with platelets and differential   Result Value Ref Range    WBC Count 9.9 4.0 - 11.0 10e3/uL    RBC Count 4.17 3.80 - 5.20 10e6/uL    Hemoglobin 11.0 (L) 11.7 - 15.7 g/dL    Hematocrit 35.8 35.0 - 47.0 %    MCV 86 78 - 100 fL    MCH 26.4 (L) 26.5 - 33.0 pg    MCHC 30.7 (L) 31.5 - 36.5 g/dL    RDW 13.5 10.0 - 15.0 %    Platelet Count 244 150 - 450 10e3/uL    % Neutrophils 75 %    % Lymphocytes 17 %    % Monocytes 5 %    % Eosinophils 3 %    % Basophils 0 %    % Immature Granulocytes 0 %    NRBCs per 100 WBC 0 <1 /100    Absolute Neutrophils 7.3 1.6 - 8.3 10e3/uL    Absolute Lymphocytes 1.7 0.8 - 5.3 10e3/uL    Absolute Monocytes 0.5 0.0 - 1.3 10e3/uL    Absolute Eosinophils 0.3 0.0 - 0.7 10e3/uL    Absolute Basophils 0.0 0.0 - 0.2 10e3/uL    Absolute Immature Granulocytes 0.0 <=0.0 10e3/uL    Absolute NRBCs 0.0 10e3/uL   Extra Red Top Tube   Result Value Ref Range    Hold Specimen JIC    Extra Green Top (Lithium Heparin) Tube   Result Value Ref Range    Hold Specimen JIC    Creatinine POCT   Result Value Ref Range    Creatinine POCT 1.1 (H) 0.5 - 1.0 mg/dL    GFR,  ESTIMATED POCT 51 (L) >60 mL/min/1.73m2   CT Abdomen Pelvis w Contrast    Narrative    EXAM: CT ABDOMEN PELVIS W CONTRAST  LOCATION: [facility]  DATE/TIME: 9/15/2021 9:25 PM    INDICATION: Diverticulitis suspected Diverticulitis, complication suspected. Left lateral side/flank pain to left lower quadrant abdominal pain with nausea and intermittent vomiting. Diarrhea.  COMPARISON: 6/17/2015 and 5/8/2015. 09/12/2013 CT study.  TECHNIQUE: CT scan of the abdomen and pelvis was performed following injection of IV contrast. Multiplanar reformats were obtained. Dose reduction techniques were used.  CONTRAST: 80mL Isovue-370    FINDINGS:   LOWER CHEST: Small hiatal hernia.     HEPATOBILIARY: Cholecystectomy.     PANCREAS: Much of the pancreas is atrophic.     SPLEEN: Normal.    ADRENAL GLANDS: Normal.    KIDNEYS/BLADDER: There is a 4 mm x 3 mm x 3 mm calculus at the right ureteropelvic junction. There is a nonobstructing 1 to 2 mm calculus at the lower pole of the right kidney. There is a linear and branching calcification in the left kidney and renal   pelvis measuring 28 mm x 5 mm x 3 mm. A few other nonobstructing calculi are seen in the left kidney. No left ureteral calculi. No hydronephrosis or hydroureter. Normal urinary bladder.  There is a 13 mm hypoattenuating focus at the lower pole of the   right kidney.    BOWEL: Normal stomach. Normal caliber of the small bowel. There is colonic diverticulosis. No diverticulitis identified.     LYMPH NODES: Normal.    VASCULATURE: Unremarkable.    PELVIC ORGANS: A 1.4 cm low-attenuation left ovarian lesion is noted.     MUSCULOSKELETAL: Normal.      Impression    IMPRESSION:   1.  There is colonic diverticulosis. No diverticulitis.  2.  Small staghorn calculus in the left kidney. 4 mm calculus at the right ureteropelvic junction. Otherwise there are small nonobstructing renal calculi. No significant hydronephrosis or hydroureter.  3.  A 13 mm  hypoattenuating focus at the lower pole of the right kidney corresponds with a renal mass on a comparison study from 2013 and could be related to partial nephrectomy.  4.  14 mm left ovarian lesion. Ultrasound should be considered, though this is most likely benign.    REFERENCE:  Management of Incidental Adnexal Findings on CT and MRI: A White Paper of the ACR Incidental Findings Committee. J Am Cristy Radiol 2020; 17(2):248-254.   Asymptomatic COVID-19 Virus (Coronavirus) by PCR Nasopharyngeal    Specimen: Nasopharyngeal; Swab   Result Value Ref Range    SARS CoV2 PCR Negative Negative    Narrative    Testing was performed using the yodit  SARS-CoV-2 & Influenza A/B Assay on the yodit  [place]  System.  This test should be ordered for the detection of SARS-COV-2 in individuals who meet SARS-CoV-2 clinical and/or epidemiological criteria. Test performance is unknown in asymptomatic patients.  This test is for in vitro diagnostic use under the FDA EUA for laboratories certified under CLIA to perform moderate and/or high complexity testing. This test has not been FDA cleared or approved.  A negative test does not rule out the presence of PCR inhibitors in the specimen or target RNA in concentration below the limit of detection for the assay. The possibility of a false negative should be considered if the patient's recent exposure or clinical presentation suggests COVID-19.  [facility] are certified under the Clinical Laboratory Improvement Amendments of 1988 (CLIA-88) as qualified to perform moderate and/or high complexity laboratory testing.   Lactic acid whole blood   Result Value Ref Range    Lactic Acid 1.0 0.7 - 2.0 mmol/L       All imaging studies reviewed by me.      Attestation:  I have reviewed today's vital signs, notes, medications, labs and imaging.  Total time: 35 minutes     Anshu Ruggiero MD

## 2021-09-16 NOTE — PHARMACY-ADMISSION MEDICATION HISTORY
Admission medication history interview status for this patient is complete. See [facility] admission navigator for allergy information, prior to admission medications and immunization status.     Medication history interview source(s):Patient  Medication history resources (including written lists, pill bottles, clinic record):Visuu list, [facility] Scripts  Primary pharmacy:[facility] [address]    Changes made to PTA medication list:  Added: biotin, ibuprofen, baclofen, lisinopril, metformin ER  Deleted: ampyria 10mg bid, ferrous sulfate 325mg bid, norco prn x 2 entries, metformin IR, zocor 40mg daily  Changed: trazodone from prn to scheduled per pt, mirapex from 1mg bid to 1mg daily per pt, b12 from daily to MWF per pt, vit c from 100mg to 1000mg per pt    Actions taken by pharmacist (provider contacted, etc):None     Additional medication history information:pt started baclofen last week, will be titrating dose up    Medication reconciliation/reorder completed by provider prior to medication history? Yes, sticky note left for MD      For patients on insulin therapy:y  Lantus/levemir/NPH/Mix 70/30 dose: Y (see below)  Sliding scale Novolog No    Patients eat three meals a day: no-two small meals daily  Pt checks BG once weekly  Denies hypoglycemia  Denies missed lantus doses    Any barriers to therapy: cost of medications/comfortable with giving injections (if applicable)/comfortable and confident with current diabetes regimen.    Prior to Admission medications    Medication Sig Last Dose Taking? Auth Provider   Ascorbic Acid (VITAMIN C PO) Take 1,000 mg by mouth daily  9/15/2021 at Unknown time Yes Reported, Patient   ASPIRIN PO Take 81 mg by mouth daily 9/15/2021 at Unknown time Yes Unknown, Entered By History   baclofen (LIORESAL) 10 MG tablet Take 10 mg by mouth 2 times daily 9/15/2021 at am Yes Unknown, Entered By History   BIOTIN PO Take 1 tablet by mouth 2 times daily 9/15/2021 at am Yes Unknown, Entered By  History   calcium-magnesium (CALMAG) 500-250 MG TABS Take 1 tablet by mouth daily 9/15/2021 at Unknown time Yes Reported, Patient   Cyanocobalamin (VITAMIN B 12 PO) Take 1,000 mcg by mouth Every Mon, Wed, Fri Morning  9/15/2021 at Unknown time Yes Reported, Patient   ibuprofen (ADVIL/MOTRIN) 200 MG tablet Take 200-800 mg by mouth every 8 hours as needed for mild pain  at no recent usage Yes Unknown, Entered By History   insulin glargine (LANTUS PEN) 100 UNIT/ML pen Inject 23 Units Subcutaneous At Bedtime  9/14/2021 at pm Yes Reported, Patient   lisinopril (ZESTRIL) 5 MG tablet Take 5 mg by mouth daily 9/15/2021 at am Yes Unknown, Entered By History   metFORMIN (GLUCOPHAGE-XR) 500 MG 24 hr tablet Take 1,000 mg by mouth 2 times daily (with meals) 9/15/2021 at am Yes Unknown, Entered By History   omeprazole (PRILOSEC) 20 MG DR capsule Take 20 mg by mouth 2 times daily 9/15/2021 at am Yes Unknown, Entered By History   pramipexole (MIRAPEX) 1 MG tablet Take 1 mg by mouth every morning 9/15/2021 at am Yes Unknown, Entered By History   SERTRALINE HCL PO Take 50 mg by mouth daily 9/15/2021 at am Yes Reported, Patient   traZODone (DESYREL) 50 MG tablet Take 50 mg by mouth At Bedtime 9/14/2021 Yes Unknown, Entered By History   VITAMIN D, CHOLECALCIFEROL, PO Take 2,000 Units by mouth daily  9/15/2021 at Unknown time Yes Unknown, Entered By History

## 2021-09-16 NOTE — PROGRESS NOTES
No charge note  I assume service care today.  Seen and examined.  Chart reviewed.  Case discussed with nursing service.  Ms. Ovalles was admitted earlier today.  This morning she feels a little better as overall her pain is improving.  Denies any nausea, vomiting.  No reported mental status changes.  Currently afebrile.  She mentioned to me that she was seen earlier this morning by urology service and no plans for urgent surgical intervention here.  We will resume diabetic diet.  Resume her home regimen of insulin.  Pursuing pelvic ultrasound given findings of ovarian lesion  Continued on antibiotics for now.   Optimize pain control, follow-up cultures

## 2021-09-16 NOTE — PROGRESS NOTES
PRIMARY DIAGNOSIS: ACUTE PAIN  OUTPATIENT/OBSERVATION GOALS TO BE MET BEFORE DISCHARGE:  1. Pain Status: Improved but still requiring IV narcotics.    2. Return to near baseline physical activity: Yes    3. Cleared for discharge by consultants (if involved): Yes    Discharge Planner Nurse   Safe discharge environment identified: Yes  Barriers to discharge: Yes - awaiting culture results.        Entered by: Natalie Franco 09/16/2021 6:53 PM     Please review provider order for any additional goals.   Nurse to notify provider when observation goals have been met and patient is ready for discharge.

## 2021-09-16 NOTE — PLAN OF CARE
PT admitted from ED with Dx: Right UPJ stone, left staghorn calculus and mild UTI. Pt is A/O. VSS on ra. No tele. LS clear. No nausea here but did complain of intermittent nausea at home. Continues to LLQ pain that she rates 2-3. Has RPIV infusing. SBA of one with transfers. Continent. NPO except ice chips. Urology consult ordered. Blood sugars qh 119, 101.

## 2021-09-16 NOTE — PLAN OF CARE
Presentation/Diagnosis: Pt admitted 9/15 with L flank pain and nausea. Renal calculi/ovarian cyst./  History: DM, DIV, bowel obstrictions   Labs/Protocols: B, 245, 132   Vitals: VSS. Mild flank pain.  Respiratory: Wdl   Neuro: Aox4   GI/: Kidney stones/diarrhea   Skin: WDL   LDA's: RPIV SL   Diet: Reg   Activity: IND    Plan: Plan is to discharge once anbx plan in place. Continue POC.

## 2021-09-16 NOTE — ED NOTES
[facility]  ED Nurse Handoff Report    Charlette Sharp is a 71 year old female   ED Chief complaint: Flank Pain  . ED Diagnosis:   Final diagnoses:   None     Allergies: No Known Allergies    Code Status: Full Code  Activity level - Baseline/Home:  Independent. Activity Level - Current:   Stand by Assist. Lift room needed: No. Bariatric: No   Needed: No   Isolation: No. Infection: Not Applicable.     Vital Signs:   Vitals:    09/15/21 2100 09/15/21 2115 09/15/21 2124 09/15/21 2209   BP: 126/79 137/80     Pulse: 73 74     Resp:       Temp:       TempSrc:       SpO2: 97% 97% 91% 97%   Weight:           Cardiac Rhythm:  ,      Pain level:    Patient confused: No. Patient Falls Risk: Yes. Only due to pain medicine  Elimination Status: Has voided   Patient Report - Initial Complaint: abdominal pain, side pain. Focused Assessment:    18:49 ED Triage Notes Addendum L lateral side/flank pain to LLQ abdominal pain with nausea and intermittent vomiting that began 2 weeks ago.  Diarrhea x 3 today without blood. Denies recent antibiotic use or urinary sx. Movement increases pain. Pt arrives with home w/c, not baseline. ABC in tact. A/Ox4     Charlette Sharp is a 71 year old female with history of diabetes, diverticulitis, and bowel obstruction who presents with left side and flank pain rated 8/10 in severity. She reports pain beginning three weeks ago, with worse pain today after eating. There are no other exacerbating or alleviating factors. The patient also notes recent diarrhea, and some nausea beginning a couple days ago and becoming worse today. She reports that her pain is similar to when she had diverticulitis in the past. The patient had some food today and notes that she has not checked her sugars very often. The patient denies dysuria, hematuria, frequency, diarrhea, vomiting, rash, fever, chest pain, shortness of breath, cough, or rhinorrhea. She also denies cardiac history and  hospitalization or surgery for her diverticulitis.      Tests Performed: labs, imaging. Abnormal Results:   Labs Ordered and Resulted from Time of ED Arrival Up to the Time of Departure from the ED   ROUTINE UA WITH MICROSCOPIC REFLEX TO CULTURE - Abnormal; Notable for the following components:       Result Value    Color Urine Orange (*)     Appearance Urine Cloudy (*)     Ketones Urine 10  (*)     Blood Urine Large (*)     Protein Albumin Urine 100  (*)     Leukocyte Esterase Urine Small (*)     Budding Yeast Urine Few (*)     Mucus Urine Present (*)     RBC Urine >182 (*)     WBC Urine 37 (*)     All other components within normal limits    Narrative:     Urine Culture ordered based on laboratory criteria   COMPREHENSIVE METABOLIC PANEL - Abnormal; Notable for the following components:    Calcium 8.1 (*)     Glucose 172 (*)     Protein Total 6.7 (*)     GFR Estimate 55 (*)     All other components within normal limits   LIPASE - Abnormal; Notable for the following components:    Lipase 69 (*)     All other components within normal limits   LACTIC ACID WHOLE BLOOD - Abnormal; Notable for the following components:    Lactic Acid 2.9 (*)     All other components within normal limits   CBC WITH PLATELETS AND DIFFERENTIAL - Abnormal; Notable for the following components:    Hemoglobin 11.0 (*)     MCH 26.4 (*)     MCHC 30.7 (*)     All other components within normal limits   ISTAT CREATININE POCT - Abnormal; Notable for the following components:    Creatinine POCT 1.1 (*)     GFR, ESTIMATED POCT 51 (*)     All other components within normal limits   COVID-19 VIRUS (CORONAVIRUS) BY PCR - Normal    Narrative:     Testing was performed using the yodit  SARS-CoV-2 & Influenza A/B Assay on the yodit  [place]  System.  This test should be ordered for the detection of SARS-COV-2 in individuals who meet SARS-CoV-2 clinical and/or epidemiological criteria. Test performance is unknown in asymptomatic patients.  This test is for in  vitro diagnostic use under the FDA EUA for laboratories certified under CLIA to perform moderate and/or high complexity testing. This test has not been FDA cleared or approved.  A negative test does not rule out the presence of PCR inhibitors in the specimen or target RNA in concentration below the limit of detection for the assay. The possibility of a false negative should be considered if the patient's recent exposure or clinical presentation suggests COVID-19.  [facility] are certified under the Clinical Laboratory Improvement Amendments of 1988 (CLIA-88) as qualified to perform moderate and/or high complexity laboratory testing.   EXTRA RED TOP TUBE   EXTRA GREEN TOP (LITHIUM HEPARIN) TUBE   LACTIC ACID WHOLE BLOOD   FREE WATER   BLOOD CULTURE   BLOOD CULTURE   URINE CULTURE   CBC WITH PLATELETS & DIFFERENTIAL    Narrative:     The following orders were created for panel order CBC with platelets differential.  Procedure                               Abnormality         Status                     ---------                               -----------         ------                     CBC with platelets and d...[[id number]]  Abnormal            Final result                 Please view results for these tests on the individual orders.   EXTRA TUBE    Narrative:     The following orders were created for panel order [place] Draw.  Procedure                               Abnormality         Status                     ---------                               -----------         ------                     Extra Red Top Tube[[id number]]                               Final result               Extra Green Top (Lithium...[[id number]]                      Final result                 Please view results for these tests on the individual orders.     CT Abdomen Pelvis w Contrast   Final Result   IMPRESSION:    1.  There is colonic diverticulosis. No diverticulitis.   2.  Small staghorn calculus in the left  kidney. 4 mm calculus at the right ureteropelvic junction. Otherwise there are small nonobstructing renal calculi. No significant hydronephrosis or hydroureter.   3.  A 13 mm hypoattenuating focus at the lower pole of the right kidney corresponds with a renal mass on a comparison study from 2013 and could be related to partial nephrectomy.   4.  14 mm left ovarian lesion. Ultrasound should be considered, though this is most likely benign.      REFERENCE:   Management of Incidental Adnexal Findings on CT and MRI: A White Paper of the ACR Incidental Findings Committee. J Am Cristy Radiol 2020; 17(2):248-254.        .   Treatments provided: pain control, antibiotics, fluids  Family Comments: spouse at bedside  OBS brochure/video discussed/provided to patient:  No  ED Medications:   Medications   0.9% sodium chloride BOLUS (1,000 mLs Intravenous New Bag 9/15/21 2049)     Followed by   sodium chloride 0.9% infusion (has no administration in time range)   HYDROmorphone (DILAUDID) injection 0.2 mg (0.2 mg Intravenous Given 9/15/21 2048)   Saline CT scan flush (60 mLs Intravenous Given 9/15/21 2130)   iopamidol (ISOVUE-370) solution 500 mL (80 mLs Intravenous Given 9/15/21 2129)   cefTRIAXone (ROCEPHIN) 1 g vial to attach to  mL bag for ADULTS or NS 50 mL bag for PEDS (0 g Intravenous Stopped 9/15/21 2251)   HYDROmorphone (PF) (DILAUDID) injection 0.5 mg (0.5 mg Intravenous Given 9/15/21 2250)     Drips infusing:  No  For the majority of the shift, the patient's behavior Green. Interventions performed were n/a.    Sepsis treatment initiated: No     Patient tested for COVID 19 prior to admission: YES    ED Nurse Name/Phone Number: Karin Tamayo RN,   11:02 PM    RECEIVING UNIT ED HANDOFF REVIEW    Above ED Nurse Handoff Report was reviewed: Yes  Reviewed by: Saskia Piper RN on September 16, 2021 at 12:26 AM

## 2021-09-16 NOTE — PROGRESS NOTES
PRIMARY DIAGNOSIS: ACUTE PAIN  OUTPATIENT/OBSERVATION GOALS TO BE MET BEFORE DISCHARGE:  1. Pain Status: Improved with use of alternative comfort measures i.e.: positioning    2. Return to near baseline physical activity: Yes    3. Cleared for discharge by consultants (if involved): Yes    Discharge Planner Nurse   Safe discharge environment identified: Yes  Barriers to discharge: No       Entered by: Natalie Franco 09/16/2021 11:57 AM     Please review provider order for any additional goals.   Nurse to notify provider when observation goals have been met and patient is ready for discharge.

## 2021-09-17 VITALS
OXYGEN SATURATION: 98 % | SYSTOLIC BLOOD PRESSURE: 138 MMHG | DIASTOLIC BLOOD PRESSURE: 72 MMHG | WEIGHT: 170.5 LBS | TEMPERATURE: 98.3 F | RESPIRATION RATE: 20 BRPM | HEART RATE: 62 BPM | BODY MASS INDEX: 32.22 KG/M2

## 2021-09-17 LAB
BACTERIA UR CULT: NORMAL
GLUCOSE BLDC GLUCOMTR-MCNC: 114 MG/DL (ref 70–99)
GLUCOSE BLDC GLUCOMTR-MCNC: 119 MG/DL (ref 70–99)
GLUCOSE BLDC GLUCOMTR-MCNC: 97 MG/DL (ref 70–99)

## 2021-09-17 PROCEDURE — 250N000013 HC RX MED GY IP 250 OP 250 PS 637: Mod: GY | Performed by: INTERNAL MEDICINE

## 2021-09-17 PROCEDURE — 250N000013 HC RX MED GY IP 250 OP 250 PS 637: Performed by: INTERNAL MEDICINE

## 2021-09-17 PROCEDURE — G0378 HOSPITAL OBSERVATION PER HR: HCPCS

## 2021-09-17 PROCEDURE — 250N000013 HC RX MED GY IP 250 OP 250 PS 637: Performed by: STUDENT IN AN ORGANIZED HEALTH CARE EDUCATION/TRAINING PROGRAM

## 2021-09-17 PROCEDURE — 99217 PR OBSERVATION CARE DISCHARGE: CPT | Performed by: INTERNAL MEDICINE

## 2021-09-17 RX ORDER — TAMSULOSIN HYDROCHLORIDE 0.4 MG/1
0.4 CAPSULE ORAL DAILY
Qty: 30 CAPSULE | Refills: 0 | Status: ON HOLD | OUTPATIENT
Start: 2021-09-18 | End: 2023-07-30

## 2021-09-17 RX ORDER — OXYCODONE HYDROCHLORIDE 5 MG/1
2.5 TABLET ORAL EVERY 6 HOURS PRN
Qty: 6 TABLET | Refills: 0 | Status: ON HOLD | OUTPATIENT
Start: 2021-09-17 | End: 2023-07-30

## 2021-09-17 RX ADMIN — PRAMIPEXOLE DIHYDROCHLORIDE 1 MG: 1 TABLET ORAL at 08:26

## 2021-09-17 RX ADMIN — SERTRALINE HYDROCHLORIDE 50 MG: 50 TABLET ORAL at 08:26

## 2021-09-17 RX ADMIN — LISINOPRIL 5 MG: 5 TABLET ORAL at 08:27

## 2021-09-17 RX ADMIN — OMEPRAZOLE 20 MG: 20 CAPSULE, DELAYED RELEASE ORAL at 08:26

## 2021-09-17 RX ADMIN — BACLOFEN 10 MG: 10 TABLET ORAL at 08:26

## 2021-09-17 RX ADMIN — TAMSULOSIN HYDROCHLORIDE 0.4 MG: 0.4 CAPSULE ORAL at 08:27

## 2021-09-17 NOTE — PLAN OF CARE
PRIMARY DIAGNOSIS: ACUTE PAIN  OUTPATIENT/OBSERVATION GOALS TO BE MET BEFORE DISCHARGE:  1. Pain Status: Improved with use of alternative comfort measures i.e.: positioning    2. Return to near baseline physical activity: Yes    3. Cleared for discharge by consultants (if involved): Yes    Discharge Planner Nurse   Safe discharge environment identified: Yes  Barriers to discharge: No       Entered by: Natalie Franco 2021 10:34 AM     Please review provider order for any additional goals.   Nurse to notify provider when observation goals have been met and patient is ready for discharge.      Presentation/Diagnosis: Pt admitted 9/15 with L flank pain and nausea. Renal calculi/ovarian cyst.  History: DM, DIV, bowel obstrictions   Labs/Protocols: B  Vitals: VSS. No pain   Respiratory: Wdl   Neuro: Aox4   GI/: Kidney stones/diarrhea   Skin: WDL   LDA's: RPIV SL   Diet: Reg   Activity: IND    Plan: Plan is to discharge today. Continue POC.        AVS reviewed with patient. Pt is in stable condition. VSS, no pain, chest pain, SOB. All discharge education reviewed with patient in regards to diet, activity, safety, s/s to report, medications and rx, follow up appointments/care. All questions answered. Pt denies any further questions or concerns. PIV removed. No complications. All belongings returned pt escorted to the front door by [facility] staff.

## 2021-09-17 NOTE — PLAN OF CARE
PRIMARY DIAGNOSIS: ACUTE PAIN  OUTPATIENT/OBSERVATION GOALS TO BE MET BEFORE DISCHARGE:  1. Pain Status: Improved-controlled with oral pain medications.    2. Return to near baseline physical activity: Yes    3. Cleared for discharge by consultants (if involved): No    Discharge Planner Nurse   Safe discharge environment identified: Yes  Barriers to discharge: No       Entered by: Eileen Looney 09/17/2021 12:08 AM     Please review provider order for any additional goals.   Nurse to notify provider when observation goals have been met and patient is ready for discharge.

## 2021-09-17 NOTE — PLAN OF CARE
PRIMARY DIAGNOSIS: ACUTE PAIN  OUTPATIENT/OBSERVATION GOALS TO BE MET BEFORE DISCHARGE:  1. Pain Status: Pain free.    2. Return to near baseline physical activity: Yes    3. Cleared for discharge by consultants (if involved): Yes    Discharge Planner Nurse   Safe discharge environment identified: Yes  Barriers to discharge: No       Entered by: Saskia Piper 09/17/2021 6:36 AM      Pt is A/O. VSS on ra. No tele. LS clear. No n/v. Denies pain. RPIV SL. Indep with transfers. Continent. Mod CHO diet.  Awaiting urine culture. IV rocephin. BS 97 and had apple juice and increased to 114.     Please review provider order for any additional goals.   Nurse to notify provider when observation goals have been met and patient is ready for discharge.

## 2021-09-17 NOTE — DISCHARGE SUMMARY
[facility]  Discharge Summary  Name: Charlette Sharp    MRN: [id number]  YOB: 1950    Age: 71 year old  Date of Discharge:  9/17/2021  Date of Admission: 9/15/2021  Primary Care Provider: Marysol Tamayo  Discharge Physician:  Sal Jamison MD  Discharging Service:  Hospitalist      Discharge Diagnosis:  Right UPJ stone, left staghorn calculus  No evidence of hydronephrosis or hydroureter  Suspected mild UTI-cultures pending  Diabetes mellitus type 2  Findings of left ovarian cyst     Other Diagnosis:  Past Medical History:   Diagnosis Date     Anemia      B12 deficiency      Chronic diarrhea      Depressive disorder      Diabetes mellitus (H)      Diverticulitis      Gastro-oesophageal reflux disease      Hypercholesteraemia      Hyperlipidemia      Hypertension      Insomnia      Kidney stone      MS (multiple sclerosis) (H)     generalized weakness     Pancreatic pseudocyst      Pancreatitis      Restless legs           Discharge Disposition:  Discharged to home     Allergies:  No Known Allergies     Discharge Medications:   Current Discharge Medication List      START taking these medications    Details   amoxicillin-clavulanate (AUGMENTIN) 875-125 MG tablet Take 1 tablet by mouth 2 times daily for 7 days  Qty: 14 tablet, Refills: 0    Associated Diagnoses: UPJ (ureteropelvic junction) obstruction      oxyCODONE (ROXICODONE) 5 MG tablet Take 0.5 tablets (2.5 mg) by mouth every 6 hours as needed for severe pain  Qty: 6 tablet, Refills: 0    Associated Diagnoses: UPJ (ureteropelvic junction) obstruction      tamsulosin (FLOMAX) 0.4 MG capsule Take 1 capsule (0.4 mg) by mouth daily  Qty: 30 capsule, Refills: 0    Associated Diagnoses: UPJ (ureteropelvic junction) obstruction         CONTINUE these medications which have NOT CHANGED    Details   Ascorbic Acid (VITAMIN C PO) Take 1,000 mg by mouth daily       ASPIRIN PO Take 81 mg by mouth daily      baclofen (LIORESAL) 10 MG tablet  Take 10 mg by mouth 2 times daily      BIOTIN PO Take 1 tablet by mouth 2 times daily      calcium-magnesium (CALMAG) 500-250 MG TABS Take 1 tablet by mouth daily      Cyanocobalamin (VITAMIN B 12 PO) Take 1,000 mcg by mouth Every Mon, Wed, Fri Morning       ibuprofen (ADVIL/MOTRIN) 200 MG tablet Take 200-800 mg by mouth every 8 hours as needed for mild pain      insulin glargine (LANTUS PEN) 100 UNIT/ML pen Inject 23 Units Subcutaneous At Bedtime     Comments: If Lantus is not covered by insurance, may substitute Basaglar at same dose and frequency.        lisinopril (ZESTRIL) 5 MG tablet Take 5 mg by mouth daily      metFORMIN (GLUCOPHAGE-XR) 500 MG 24 hr tablet Take 1,000 mg by mouth 2 times daily (with meals)      omeprazole (PRILOSEC) 20 MG DR capsule Take 20 mg by mouth 2 times daily      pramipexole (MIRAPEX) 1 MG tablet Take 1 mg by mouth every morning      SERTRALINE HCL PO Take 50 mg by mouth daily      traZODone (DESYREL) 50 MG tablet Take 50 mg by mouth At Bedtime      VITAMIN D, CHOLECALCIFEROL, PO Take 2,000 Units by mouth daily       ACCU-CHEK ESPERANZA None Entered              Condition on Discharge:  Discharge condition: Stable   Discharge vitals: Blood pressure 138/72, pulse 62, temperature 98.3  F (36.8  C), temperature source Oral, resp. rate 20, weight 77.3 kg (170 lb 8 oz), SpO2 98 %.   Code status on discharge: Full Code     History of Present Illness:  See detailed admission note for full details.        Significant Physical Exam Findings Day of Discharge:  HEENT; Atraumatic, normocephalic, pinkish conjuctiva, pupils bilateral reactive   Skin: warm and moist, no rashes  Lungs: equal chest expansion, clear to auscultation, no wheezes, no stridor, no crackles,   Heart: normal rate, normal rhythm, no rubs or gallops.   Abdomen: normal bowel sounds, no tenderness, no peritoneal signs, no guarding  Extremities: no deformities, no edema   Neuro; follow commands, alert and oriented x3, spontaneous  speech, coherent, moves all extremities spontaneously  Psych; no hallucination, euthymic mood, not agitated        Procedures other than Imaging:  none     Imaging:  Results for orders placed or performed during the hospital encounter of 09/15/21   CT Abdomen Pelvis w Contrast    Narrative    EXAM: CT ABDOMEN PELVIS W CONTRAST  LOCATION: [facility]  DATE/TIME: 9/15/2021 9:25 PM    INDICATION: Diverticulitis suspected Diverticulitis, complication suspected. Left lateral side/flank pain to left lower quadrant abdominal pain with nausea and intermittent vomiting. Diarrhea.  COMPARISON: 6/17/2015 and 5/8/2015. 09/12/2013 CT study.  TECHNIQUE: CT scan of the abdomen and pelvis was performed following injection of IV contrast. Multiplanar reformats were obtained. Dose reduction techniques were used.  CONTRAST: 80mL Isovue-370    FINDINGS:   LOWER CHEST: Small hiatal hernia.     HEPATOBILIARY: Cholecystectomy.     PANCREAS: Much of the pancreas is atrophic.     SPLEEN: Normal.    ADRENAL GLANDS: Normal.    KIDNEYS/BLADDER: There is a 4 mm x 3 mm x 3 mm calculus at the right ureteropelvic junction. There is a nonobstructing 1 to 2 mm calculus at the lower pole of the right kidney. There is a linear and branching calcification in the left kidney and renal   pelvis measuring 28 mm x 5 mm x 3 mm. A few other nonobstructing calculi are seen in the left kidney. No left ureteral calculi. No hydronephrosis or hydroureter. Normal urinary bladder.  There is a 13 mm hypoattenuating focus at the lower pole of the   right kidney.    BOWEL: Normal stomach. Normal caliber of the small bowel. There is colonic diverticulosis. No diverticulitis identified.     LYMPH NODES: Normal.    VASCULATURE: Unremarkable.    PELVIC ORGANS: A 1.4 cm low-attenuation left ovarian lesion is noted.     MUSCULOSKELETAL: Normal.      Impression    IMPRESSION:   1.  There is colonic diverticulosis. No diverticulitis.  2.  Small staghorn  calculus in the left kidney. 4 mm calculus at the right ureteropelvic junction. Otherwise there are small nonobstructing renal calculi. No significant hydronephrosis or hydroureter.  3.  A 13 mm hypoattenuating focus at the lower pole of the right kidney corresponds with a renal mass on a comparison study from 2013 and could be related to partial nephrectomy.  4.  14 mm left ovarian lesion. Ultrasound should be considered, though this is most likely benign.    REFERENCE:  Management of Incidental Adnexal Findings on CT and MRI: A White Paper of the ACR Incidental Findings Committee. J Am Cristy Radiol 2020; 17(2):248-254.   US Pelvic Complete with Transvaginal    Narrative    ULTRASOUND PELVIC COMPLETE WITH TRANSVAGINAL IMAGING  9/16/2021 11:24  AM    CLINICAL HISTORY: Left ovarian mass.    TECHNIQUE: Transabdominal scans were performed. Endovaginal ultrasound  was performed to better visualize the adnexa.    COMPARISON: None.    FINDINGS:  UTERUS: 5.7 x 3.8 x 3.8 cm. Normal in size and position with no  masses.    ENDOMETRIUM: 12 mm. This is abnormally thickened in a postmenopausal  patient.    RIGHT OVARY: Obscured by bowel gas.    LEFT OVARY: 2.3 x 1.7 x 2.0 cm. Anechoic focus is noted in the left  ovary consistent with a cyst.    Trace free pelvic fluid.      Impression    IMPRESSION:  1.  Endometrial thickening for a postmenopausal patient concerning for  endometrial hyperplasia or neoplasia.    2.  Simple-appearing cyst left ovary measures 1.4 cm. No specific  follow-up suggested. Right ovary obscured by bowel gas.    UMER YANG MD         SYSTEM ID:  [id number]        Consultations:  Consultation during this admission received from urology.     Recent Lab Results:  Recent Labs   Lab 09/15/21  2045   WBC 9.9   HGB 11.0*   HCT 35.8   MCV 86        No results for input(s): CULT in the last 168 hours.  Recent Labs   Lab 09/17/21  0707 09/17/21  0327 09/17/21  0213 09/16/21  0850 09/16/21  0714 09/16/21  0132  09/15/21  2055 09/15/21  2045   NA  --   --   --   --  143  --   --  142   POTASSIUM  --   --   --   --  3.4  --   --  4.0   CHLORIDE  --   --   --   --  111*  --   --  108   CO2  --   --   --   --  29  --   --  27   ANIONGAP  --   --   --   --  3  --   --  7   * 114* 97   < > 117*   < >  --  172*   BUN  --   --   --   --  15  --   --  15   CR  --   --   --   --  0.80  --  1.1* 1.04   GFRESTIMATED  --   --   --   --  75  --  51* 55*   TATYANA  --   --   --   --  7.6*  --   --  8.1*   PROTTOTAL  --   --   --   --   --   --   --  6.7*   ALBUMIN  --   --   --   --   --   --   --  3.4   BILITOTAL  --   --   --   --   --   --   --  0.5   ALKPHOS  --   --   --   --   --   --   --  78   AST  --   --   --   --   --   --   --  12   ALT  --   --   --   --   --   --   --  21    < > = values in this interval not displayed.     Recent Labs   Lab 09/17/21  0707 09/17/21  0327 09/17/21  0213 09/16/21  2100 09/16/21  1718   * 114* 97 128* 132*     Recent Labs   Lab 09/15/21  2316 09/15/21  2045   LACT 1.0 2.9*     No results for input(s): TROPONIN, TROPI, TROPR in the last 168 hours.    Invalid input(s): TROP, TROPONINIES  Recent Labs   Lab 09/15/21  2010   COLOR Orange*   APPEARANCE Cloudy*   URINEGLC Negative   URINEBILI Negative   URINEKETONE 10 *   SG 1.028   UBLD Large*   URINEPH 5.5   PROTEIN 100 *   NITRITE Negative   LEUKEST Small*   RBCU >182*   WBCU 37*          Pending Results:    Unresulted Labs Ordered in the Past 30 Days of this Admission     Date and Time Order Name Status Description    9/15/2021  9:29 PM Blood Culture Peripheral Blood Preliminary     9/15/2021  9:29 PM Blood Culture Peripheral Blood Preliminary            Discharge Instructions and Follow-Up:   Discharge diet: Orders Placed This Encounter      Consistent Carb 60 grams CHO per Meal Diet      Diet     Discharge activity: Activity as tolerated   Discharge follow-up: 1-2 weeks with PCP  Follow-up with your urology as scheduled   Outpatient  therapy: None    Other instructions: None      Hospital Course:  Ms. Ovalles is a very pleasant 71-year-old  lady who came in here due to increasing symptomatology of left lower quadrant pain and found with abnormal urine analysis and CT scan showing bilateral lower kidney stones.  Findings of 4 mm right UPJ calculus, findings of renal mass with prior history of partial nephrectomy.  She was also found with 14 mm left ovarian lesion and subsequent ultrasound showed simple ovarian cyst.  Concerns for accompanying urinary tract infection initiated antibiotics and continued upon discharge.  Cultures from the urine remain no growth up-to-date.  This can be followed up as outpatient.  Overall she is improving with earlier pain and tolerating Flomax as started by urology of which to be continued upon discharge.  She is requesting for a oral pain regimen just in case she gets recurrence of earlier renal colic picture pain.  Prescribed with minimal, low-dose oral oxycodone upon discharge.  Discussed with her risks and benefits and side effects of narcotic medications.  She is hoping for home discharge and will proceed with this today.     Total time spent in face to face contact with the patient and coordinating discharge was:  > 30 Minutes.

## 2021-09-21 LAB
BACTERIA BLD CULT: NO GROWTH
BACTERIA BLD CULT: NO GROWTH

## 2021-10-30 ENCOUNTER — HEALTH MAINTENANCE LETTER (OUTPATIENT)
Age: 71
End: 2021-10-30

## 2022-02-19 ENCOUNTER — HEALTH MAINTENANCE LETTER (OUTPATIENT)
Age: 72
End: 2022-02-19

## 2022-04-03 ENCOUNTER — HOSPITAL ENCOUNTER (EMERGENCY)
Facility: CLINIC | Age: 72
Discharge: HOME | End: 2022-04-03
Attending: EMERGENCY MEDICINE | Admitting: EMERGENCY MEDICINE
Payer: COMMERCIAL

## 2022-04-03 ENCOUNTER — APPOINTMENT (OUTPATIENT)
Dept: CT IMAGING | Facility: CLINIC | Age: 72
End: 2022-04-03
Attending: EMERGENCY MEDICINE
Payer: COMMERCIAL

## 2022-04-03 VITALS
RESPIRATION RATE: 14 BRPM | OXYGEN SATURATION: 97 % | DIASTOLIC BLOOD PRESSURE: 80 MMHG | SYSTOLIC BLOOD PRESSURE: 111 MMHG | HEART RATE: 53 BPM | TEMPERATURE: 97.8 F

## 2022-04-03 DIAGNOSIS — R10.32 LLQ ABDOMINAL PAIN: ICD-10-CM

## 2022-04-03 DIAGNOSIS — N39.0 UTI (URINARY TRACT INFECTION) WITH PYURIA: ICD-10-CM

## 2022-04-03 DIAGNOSIS — N20.0 KIDNEY STONE: ICD-10-CM

## 2022-04-03 LAB
ALBUMIN SERPL-MCNC: 3.6 G/DL (ref 3.4–5)
ALBUMIN UR-MCNC: 200 MG/DL
ALP SERPL-CCNC: 90 U/L (ref 40–150)
ALT SERPL W P-5'-P-CCNC: 20 U/L (ref 0–50)
AMORPH CRY #/AREA URNS HPF: ABNORMAL /HPF
ANION GAP SERPL CALCULATED.3IONS-SCNC: 7 MMOL/L (ref 3–14)
APPEARANCE UR: ABNORMAL
AST SERPL W P-5'-P-CCNC: 9 U/L (ref 0–45)
BACTERIA #/AREA URNS HPF: ABNORMAL /HPF
BASOPHILS # BLD AUTO: 0 10E3/UL (ref 0–0.2)
BASOPHILS NFR BLD AUTO: 0 %
BILIRUB SERPL-MCNC: 0.3 MG/DL (ref 0.2–1.3)
BILIRUB UR QL STRIP: NEGATIVE
BUN SERPL-MCNC: 16 MG/DL (ref 7–30)
CALCIUM SERPL-MCNC: 8.5 MG/DL (ref 8.5–10.1)
CHLORIDE BLD-SCNC: 104 MMOL/L (ref 94–109)
CO2 SERPL-SCNC: 26 MMOL/L (ref 20–32)
COLOR UR AUTO: YELLOW
CREAT SERPL-MCNC: 0.78 MG/DL (ref 0.52–1.04)
EOSINOPHIL # BLD AUTO: 0.2 10E3/UL (ref 0–0.7)
EOSINOPHIL NFR BLD AUTO: 2 %
ERYTHROCYTE [DISTWIDTH] IN BLOOD BY AUTOMATED COUNT: 14 % (ref 10–15)
GFR SERPL CREATININE-BSD FRML MDRD: 81 ML/MIN/1.73M2
GLUCOSE BLD-MCNC: 218 MG/DL (ref 70–99)
GLUCOSE UR STRIP-MCNC: 70 MG/DL
GRANULAR CAST: 1 /LPF
HCT VFR BLD AUTO: 37.9 % (ref 35–47)
HGB BLD-MCNC: 11.8 G/DL (ref 11.7–15.7)
HGB UR QL STRIP: ABNORMAL
HOLD SPECIMEN: NORMAL
HYALINE CASTS: 5 /LPF
IMM GRANULOCYTES # BLD: 0 10E3/UL
IMM GRANULOCYTES NFR BLD: 0 %
KETONES UR STRIP-MCNC: 10 MG/DL
LEUKOCYTE ESTERASE UR QL STRIP: ABNORMAL
LIPASE SERPL-CCNC: 81 U/L (ref 73–393)
LYMPHOCYTES # BLD AUTO: 1.5 10E3/UL (ref 0.8–5.3)
LYMPHOCYTES NFR BLD AUTO: 17 %
MCH RBC QN AUTO: 26.1 PG (ref 26.5–33)
MCHC RBC AUTO-ENTMCNC: 31.1 G/DL (ref 31.5–36.5)
MCV RBC AUTO: 84 FL (ref 78–100)
MONOCYTES # BLD AUTO: 0.4 10E3/UL (ref 0–1.3)
MONOCYTES NFR BLD AUTO: 5 %
MUCOUS THREADS #/AREA URNS LPF: PRESENT /LPF
NEUTROPHILS # BLD AUTO: 6.8 10E3/UL (ref 1.6–8.3)
NEUTROPHILS NFR BLD AUTO: 76 %
NITRATE UR QL: NEGATIVE
NRBC # BLD AUTO: 0 10E3/UL
NRBC BLD AUTO-RTO: 0 /100
PH UR STRIP: 5.5 [PH] (ref 5–7)
PLATELET # BLD AUTO: 268 10E3/UL (ref 150–450)
POTASSIUM BLD-SCNC: 4 MMOL/L (ref 3.4–5.3)
PROT SERPL-MCNC: 7 G/DL (ref 6.8–8.8)
RBC # BLD AUTO: 4.52 10E6/UL (ref 3.8–5.2)
RBC URINE: 88 /HPF
SODIUM SERPL-SCNC: 137 MMOL/L (ref 133–144)
SP GR UR STRIP: 1.03 (ref 1–1.03)
SQUAMOUS EPITHELIAL: 2 /HPF
TRANSITIONAL EPI: 3 /HPF
UROBILINOGEN UR STRIP-MCNC: NORMAL MG/DL
WBC # BLD AUTO: 8.9 10E3/UL (ref 4–11)
WBC URINE: 30 /HPF

## 2022-04-03 PROCEDURE — 99285 EMERGENCY DEPT VISIT HI MDM: CPT | Mod: 25

## 2022-04-03 PROCEDURE — 85025 COMPLETE CBC W/AUTO DIFF WBC: CPT | Performed by: EMERGENCY MEDICINE

## 2022-04-03 PROCEDURE — 96361 HYDRATE IV INFUSION ADD-ON: CPT

## 2022-04-03 PROCEDURE — 74177 CT ABD & PELVIS W/CONTRAST: CPT

## 2022-04-03 PROCEDURE — 36415 COLL VENOUS BLD VENIPUNCTURE: CPT | Performed by: EMERGENCY MEDICINE

## 2022-04-03 PROCEDURE — 250N000013 HC RX MED GY IP 250 OP 250 PS 637: Performed by: EMERGENCY MEDICINE

## 2022-04-03 PROCEDURE — 96374 THER/PROPH/DIAG INJ IV PUSH: CPT | Mod: 59

## 2022-04-03 PROCEDURE — 250N000009 HC RX 250: Performed by: EMERGENCY MEDICINE

## 2022-04-03 PROCEDURE — 250N000011 HC RX IP 250 OP 636: Performed by: EMERGENCY MEDICINE

## 2022-04-03 PROCEDURE — 96375 TX/PRO/DX INJ NEW DRUG ADDON: CPT

## 2022-04-03 PROCEDURE — 87086 URINE CULTURE/COLONY COUNT: CPT | Performed by: EMERGENCY MEDICINE

## 2022-04-03 PROCEDURE — 80053 COMPREHEN METABOLIC PANEL: CPT | Performed by: EMERGENCY MEDICINE

## 2022-04-03 PROCEDURE — 258N000003 HC RX IP 258 OP 636: Performed by: EMERGENCY MEDICINE

## 2022-04-03 PROCEDURE — 81001 URINALYSIS AUTO W/SCOPE: CPT | Performed by: EMERGENCY MEDICINE

## 2022-04-03 PROCEDURE — 83690 ASSAY OF LIPASE: CPT | Performed by: EMERGENCY MEDICINE

## 2022-04-03 RX ORDER — CEPHALEXIN 500 MG/1
500 CAPSULE ORAL 2 TIMES DAILY
Qty: 10 CAPSULE | Refills: 0 | Status: SHIPPED | OUTPATIENT
Start: 2022-04-03 | End: 2022-04-08

## 2022-04-03 RX ORDER — IOPAMIDOL 755 MG/ML
500 INJECTION, SOLUTION INTRAVASCULAR ONCE
Status: COMPLETED | OUTPATIENT
Start: 2022-04-03 | End: 2022-04-03

## 2022-04-03 RX ORDER — MORPHINE SULFATE 4 MG/ML
4 INJECTION, SOLUTION INTRAMUSCULAR; INTRAVENOUS
Status: COMPLETED | OUTPATIENT
Start: 2022-04-03 | End: 2022-04-03

## 2022-04-03 RX ORDER — CEPHALEXIN 500 MG/1
500 CAPSULE ORAL ONCE
Status: COMPLETED | OUTPATIENT
Start: 2022-04-03 | End: 2022-04-03

## 2022-04-03 RX ORDER — ONDANSETRON 2 MG/ML
4 INJECTION INTRAMUSCULAR; INTRAVENOUS EVERY 30 MIN PRN
Status: DISCONTINUED | OUTPATIENT
Start: 2022-04-03 | End: 2022-04-03 | Stop reason: HOSPADM

## 2022-04-03 RX ADMIN — IOPAMIDOL 85 ML: 755 INJECTION, SOLUTION INTRAVENOUS at 12:29

## 2022-04-03 RX ADMIN — SODIUM CHLORIDE 1000 ML: 9 INJECTION, SOLUTION INTRAVENOUS at 11:33

## 2022-04-03 RX ADMIN — ONDANSETRON 4 MG: 2 INJECTION INTRAMUSCULAR; INTRAVENOUS at 11:33

## 2022-04-03 RX ADMIN — CEPHALEXIN 500 MG: 500 CAPSULE ORAL at 13:39

## 2022-04-03 RX ADMIN — MORPHINE SULFATE 4 MG: 4 INJECTION INTRAVENOUS at 11:34

## 2022-04-03 RX ADMIN — SODIUM CHLORIDE 61 ML: 9 INJECTION, SOLUTION INTRAVENOUS at 12:29

## 2022-04-03 ASSESSMENT — ENCOUNTER SYMPTOMS
CHILLS: 0
ABDOMINAL PAIN: 1
CONSTIPATION: 1
COUGH: 0
FEVER: 0

## 2022-04-03 NOTE — ED PROVIDER NOTES
"  History   Chief Complaint:  Abdominal Pain     The history is provided by the patient.      Charlette Sharp is a 72 year old female with a history of SBO, MS, and diabetes who presents with her  for abdominal pain. The patient has had left-sided lower abdominal pain, which she described as something  \"jamming into her stomach\". Her pain is intermitted and lasts for some times hours. She was constipated prior to arrival, but made a bowel movement here. Her BM's range from runny to non-runny. She states most of the pain comes from putting pressure, such as eating or changing her clothes. Here, her pain is a 8-9/10. She denies having any fevers, chills, or coughs. Furthermore, denies any recent travels.     Of note, the patient has had her gallbladder removed.     Review of Systems   Constitutional: Negative for chills and fever.   Respiratory: Negative for cough.    Gastrointestinal: Positive for abdominal pain and constipation (resolved).   All other systems reviewed and are negative.    Allergies:  The patient denies having any allergies.    Medications:  aspirin  baclofen  biotin  calcium-magnesium  Cyanocobalamin  lisinopril  metFORMIN  omeprazole  oxyCODONE   pramipexole   Sertraline   tamsulosin   traZODone     Past Medical History:     Anemia   B12 deficiency   Chronic diarrhea   Depressive disorder   Diabetes mellitus   Diverticulitis   Gastro-oesophageal reflux disease   Hypercholesteraemia   Hyperlipidemia   Hypertension   Insomnia   Kidney stone   MS   Pancreatic pseudocyst   Pancreatitis  SBO  Renal mass  UPJ     Past Surgical History:    Appendectomy    Cholecystectomy   Colonoscopy  Laparoscopic cryoablation tumor kidney  Laser holmium lithotripsy ureters, insert stent combined  Nephrectomy partial     Family History:    Hypertension - Sister  Hypertension - Brother  Hyperlipidemia - Sister  Hyperlipidemia - Brother    Social History:  The patient is .  The patient is not a " smoker.    Physical Exam     Patient Vitals for the past 24 hrs:   BP Temp Temp src Pulse Resp SpO2   04/03/22 1200 120/64 -- -- 79 -- 99 %   04/03/22 1140 125/80 -- -- 74 -- 99 %   04/03/22 1115 129/85 -- -- 77 -- 95 %   04/03/22 1026 (!) 122/92 97.8  F (36.6  C) Temporal 98 20 97 %     Physical Exam    General: The patient is alert, in no respiratory distress.    HENT: Mucous membranes moist.    Cardiovascular: Regular rate and rhythm. Good pulses in all four extremities. Normal capillary refill and skin turgor.     Respiratory: Lungs are clear. No nasal flaring. No retractions. No wheezing, no crackles.    Gastrointestinal: Abdomen soft. No guarding, no rebound. No palpable hernias. Left flank tenderness. Lower quadrant abdominal tenderness.    Musculoskeletal: No gross deformity.     Skin: No rashes or petechiae.     Neurologic: The patient is alert and oriented x3. GCS 15. No testable cranial nerve deficit. Follows commands with clear and appropriate speech. Gives appropriate answers. Good strength in all extremities. No gross neurologic deficit. Gross sensation intact. Pupils are round and reactive. No meningismus.     Lymphatic: No cervical adenopathy. No lower extremity swelling.    Psychiatric: The patient is non-tearful.    Emergency Department Course     Imaging:  CT Abdomen Pelvis w Contrast   Final Result   IMPRESSION:    1.  Colonic diverticulosis without evidence of diverticulitis.      2.  There are small filling defects in the proximal left ureter which may represent debris or blood products, with mild left hydronephrosis. Mild fat stranding around the left renal pelvis and proximal ureter, likely reactive.      3.  Left nephrolithiasis.        Report per radiology    Laboratory:  Labs Ordered and Resulted from Time of ED Arrival to Time of ED Departure   ROUTINE UA WITH MICROSCOPIC REFLEX TO CULTURE - Abnormal       Result Value    Color Urine Yellow      Appearance Urine Slightly Cloudy (*)      Glucose Urine 70  (*)     Bilirubin Urine Negative      Ketones Urine 10  (*)     Specific Gravity Urine 1.029      Blood Urine Moderate (*)     pH Urine 5.5      Protein Albumin Urine 200  (*)     Urobilinogen Urine Normal      Nitrite Urine Negative      Leukocyte Esterase Urine Moderate (*)     Bacteria Urine Few (*)     Mucus Urine Present (*)     Amorphous Crystals Urine Few (*)     RBC Urine 88 (*)     WBC Urine 30 (*)     Squamous Epithelials Urine 2 (*)     Transitional Epithelials Urine 3 (*)     Hyaline Casts Urine 5 (*)     Granular Casts Urine 1 (*)    COMPREHENSIVE METABOLIC PANEL - Abnormal    Sodium 137      Potassium 4.0      Chloride 104      Carbon Dioxide (CO2) 26      Anion Gap 7      Urea Nitrogen 16      Creatinine 0.78      Calcium 8.5      Glucose 218 (*)     Alkaline Phosphatase 90      AST 9      ALT 20      Protein Total 7.0      Albumin 3.6      Bilirubin Total 0.3      GFR Estimate 81     CBC WITH PLATELETS AND DIFFERENTIAL - Abnormal    WBC Count 8.9      RBC Count 4.52      Hemoglobin 11.8      Hematocrit 37.9      MCV 84      MCH 26.1 (*)     MCHC 31.1 (*)     RDW 14.0      Platelet Count 268      % Neutrophils 76      % Lymphocytes 17      % Monocytes 5      % Eosinophils 2      % Basophils 0      % Immature Granulocytes 0      NRBCs per 100 WBC 0      Absolute Neutrophils 6.8      Absolute Lymphocytes 1.5      Absolute Monocytes 0.4      Absolute Eosinophils 0.2      Absolute Basophils 0.0      Absolute Immature Granulocytes 0.0      Absolute NRBCs 0.0     LIPASE - Normal    Lipase 81     URINE CULTURE   URINE CULTURE     Emergency Department Course:          Reviewed:  I reviewed nursing notes, vitals, past medical history, Care Everywhere and [facility]    Assessments:  1056 I obtained history and examined the patient as noted above.   1320 I rechecked the patient and explained findings.    Interventions:  1133 0.9% sodium chloride BOLUS, IV  1133 ondansetron (ZOFRAN) injection 4 mg,  IV  1134 morphine (PF) injection 4 mg, IV    Disposition:  The patient was discharged to home.     Impression & Plan     CMS Diagnoses: None.    Medical Decision Making:  The patient presented with left lower quadrant abdominal pain she had some tenderness on the left side to percussion as well.  In reviewing the history with the patient and checking the chart the patient had a previous staghorn calculus that was broken up and treated by urology.  This likely would explain the findings on her CT.  The patient did have some hematuria and white blood cells in her urine I did feel UTI is a possibility here and start her on antibiotics a culture was sent and is pending.  The patient's white count is reassuring.  There is diverticulosis but no signs of diverticulitis.  No obstruction.  Her abdominal exam is benign the patient is not appear toxic.  I stressed that the patient need to follow-up closely and she was discharged home in good condition.    Diagnosis:    ICD-10-CM    1. LLQ abdominal pain  R10.32    2. UTI (urinary tract infection) with pyuria  N39.0    3. Kidney stone  N20.0      Discharge Medications:  New Prescriptions    CEPHALEXIN (KEFLEX) 500 MG CAPSULE    Take 1 capsule (500 mg) by mouth 2 times daily for 5 days     Scribe Disclosure:  Jessica FUNES, am serving as a scribe at 11:28 AM on 4/3/2022 to document services personally performed by Hugh Giraldo MD based on my observations and the provider's statements to me.       Hugh Giraldo MD  04/03/22 141

## 2022-04-03 NOTE — ED TRIAGE NOTES
Left lower abdominal pain. Feels urge for frequent bowel movements but unable to go. Last BM yesterday around 1500.

## 2022-04-05 LAB — BACTERIA UR CULT: NORMAL

## 2022-10-16 ENCOUNTER — HEALTH MAINTENANCE LETTER (OUTPATIENT)
Age: 72
End: 2022-10-16

## 2022-12-16 ENCOUNTER — TRANSFERRED RECORDS (OUTPATIENT)
Dept: HEALTH INFORMATION MANAGEMENT | Facility: CLINIC | Age: 72
End: 2022-12-16

## 2023-01-12 PROBLEM — R93.89 ENDOMETRIAL THICKENING ON ULTRASOUND: Status: ACTIVE | Noted: 2023-01-12

## 2023-03-01 RX ORDER — SIMVASTATIN 40 MG
40 TABLET ORAL AT BEDTIME
COMMUNITY

## 2023-03-02 ENCOUNTER — HOSPITAL ENCOUNTER (OUTPATIENT)
Facility: CLINIC | Age: 73
Discharge: HOME | End: 2023-03-02
Attending: OBSTETRICS & GYNECOLOGY | Admitting: OBSTETRICS & GYNECOLOGY
Payer: COMMERCIAL

## 2023-03-02 ENCOUNTER — ANESTHESIA (OUTPATIENT)
Dept: SURGERY | Facility: CLINIC | Age: 73
End: 2023-03-02
Payer: COMMERCIAL

## 2023-03-02 ENCOUNTER — ANESTHESIA EVENT (OUTPATIENT)
Dept: SURGERY | Facility: CLINIC | Age: 73
End: 2023-03-02
Payer: COMMERCIAL

## 2023-03-02 VITALS
DIASTOLIC BLOOD PRESSURE: 73 MMHG | WEIGHT: 150.7 LBS | HEART RATE: 88 BPM | RESPIRATION RATE: 16 BRPM | SYSTOLIC BLOOD PRESSURE: 103 MMHG | HEIGHT: 61 IN | TEMPERATURE: 97 F | OXYGEN SATURATION: 97 % | BODY MASS INDEX: 28.45 KG/M2

## 2023-03-02 LAB
GLUCOSE BLDC GLUCOMTR-MCNC: 155 MG/DL (ref 70–99)
GLUCOSE BLDC GLUCOMTR-MCNC: 207 MG/DL (ref 70–99)

## 2023-03-02 PROCEDURE — 710N000009 HC RECOVERY PHASE 1, LEVEL 1, PER MIN: Performed by: OBSTETRICS & GYNECOLOGY

## 2023-03-02 PROCEDURE — 250N000011 HC RX IP 250 OP 636: Performed by: OBSTETRICS & GYNECOLOGY

## 2023-03-02 PROCEDURE — 88305 TISSUE EXAM BY PATHOLOGIST: CPT | Mod: TC | Performed by: OBSTETRICS & GYNECOLOGY

## 2023-03-02 PROCEDURE — 370N000017 HC ANESTHESIA TECHNICAL FEE, PER MIN: Performed by: OBSTETRICS & GYNECOLOGY

## 2023-03-02 PROCEDURE — 250N000009 HC RX 250: Performed by: NURSE ANESTHETIST, CERTIFIED REGISTERED

## 2023-03-02 PROCEDURE — 250N000013 HC RX MED GY IP 250 OP 250 PS 637: Performed by: OBSTETRICS & GYNECOLOGY

## 2023-03-02 PROCEDURE — 999N000141 HC STATISTIC PRE-PROCEDURE NURSING ASSESSMENT: Performed by: OBSTETRICS & GYNECOLOGY

## 2023-03-02 PROCEDURE — 710N000012 HC RECOVERY PHASE 2, PER MINUTE: Performed by: OBSTETRICS & GYNECOLOGY

## 2023-03-02 PROCEDURE — 250N000011 HC RX IP 250 OP 636: Performed by: NURSE ANESTHETIST, CERTIFIED REGISTERED

## 2023-03-02 PROCEDURE — 82962 GLUCOSE BLOOD TEST: CPT

## 2023-03-02 PROCEDURE — 360N000076 HC SURGERY LEVEL 3, PER MIN: Performed by: OBSTETRICS & GYNECOLOGY

## 2023-03-02 PROCEDURE — 258N000003 HC RX IP 258 OP 636: Performed by: NURSE ANESTHETIST, CERTIFIED REGISTERED

## 2023-03-02 PROCEDURE — 88305 TISSUE EXAM BY PATHOLOGIST: CPT | Mod: 26 | Performed by: PATHOLOGY

## 2023-03-02 PROCEDURE — 272N000001 HC OR GENERAL SUPPLY STERILE: Performed by: OBSTETRICS & GYNECOLOGY

## 2023-03-02 PROCEDURE — 250N000025 HC SEVOFLURANE, PER MIN: Performed by: OBSTETRICS & GYNECOLOGY

## 2023-03-02 PROCEDURE — 258N000003 HC RX IP 258 OP 636: Performed by: ANESTHESIOLOGY

## 2023-03-02 PROCEDURE — 250N000011 HC RX IP 250 OP 636: Performed by: ANESTHESIOLOGY

## 2023-03-02 RX ORDER — FENTANYL CITRATE 50 UG/ML
INJECTION, SOLUTION INTRAMUSCULAR; INTRAVENOUS PRN
Status: DISCONTINUED | OUTPATIENT
Start: 2023-03-02 | End: 2023-03-02

## 2023-03-02 RX ORDER — FENTANYL CITRATE 50 UG/ML
25 INJECTION, SOLUTION INTRAMUSCULAR; INTRAVENOUS EVERY 5 MIN PRN
Status: DISCONTINUED | OUTPATIENT
Start: 2023-03-02 | End: 2023-03-02 | Stop reason: HOSPADM

## 2023-03-02 RX ORDER — PROPOFOL 10 MG/ML
INJECTION, EMULSION INTRAVENOUS PRN
Status: DISCONTINUED | OUTPATIENT
Start: 2023-03-02 | End: 2023-03-02

## 2023-03-02 RX ORDER — ONDANSETRON 2 MG/ML
INJECTION INTRAMUSCULAR; INTRAVENOUS PRN
Status: DISCONTINUED | OUTPATIENT
Start: 2023-03-02 | End: 2023-03-02

## 2023-03-02 RX ORDER — SODIUM CHLORIDE, SODIUM LACTATE, POTASSIUM CHLORIDE, CALCIUM CHLORIDE 600; 310; 30; 20 MG/100ML; MG/100ML; MG/100ML; MG/100ML
INJECTION, SOLUTION INTRAVENOUS CONTINUOUS
Status: DISCONTINUED | OUTPATIENT
Start: 2023-03-02 | End: 2023-03-02 | Stop reason: HOSPADM

## 2023-03-02 RX ORDER — HYDROMORPHONE HCL IN WATER/PF 6 MG/30 ML
0.4 PATIENT CONTROLLED ANALGESIA SYRINGE INTRAVENOUS EVERY 5 MIN PRN
Status: DISCONTINUED | OUTPATIENT
Start: 2023-03-02 | End: 2023-03-02 | Stop reason: HOSPADM

## 2023-03-02 RX ORDER — ONDANSETRON 2 MG/ML
4 INJECTION INTRAMUSCULAR; INTRAVENOUS EVERY 30 MIN PRN
Status: DISCONTINUED | OUTPATIENT
Start: 2023-03-02 | End: 2023-03-02 | Stop reason: HOSPADM

## 2023-03-02 RX ORDER — GLYCOPYRROLATE 0.2 MG/ML
INJECTION, SOLUTION INTRAMUSCULAR; INTRAVENOUS PRN
Status: DISCONTINUED | OUTPATIENT
Start: 2023-03-02 | End: 2023-03-02

## 2023-03-02 RX ORDER — LIDOCAINE HYDROCHLORIDE 20 MG/ML
INJECTION, SOLUTION INFILTRATION; PERINEURAL PRN
Status: DISCONTINUED | OUTPATIENT
Start: 2023-03-02 | End: 2023-03-02

## 2023-03-02 RX ORDER — ACETAMINOPHEN 325 MG/1
975 TABLET ORAL ONCE
Status: DISCONTINUED | OUTPATIENT
Start: 2023-03-02 | End: 2023-03-02 | Stop reason: HOSPADM

## 2023-03-02 RX ORDER — CEFAZOLIN SODIUM/WATER 2 G/20 ML
2 SYRINGE (ML) INTRAVENOUS SEE ADMIN INSTRUCTIONS
Status: DISCONTINUED | OUTPATIENT
Start: 2023-03-02 | End: 2023-03-02 | Stop reason: HOSPADM

## 2023-03-02 RX ORDER — FENTANYL CITRATE 50 UG/ML
50 INJECTION, SOLUTION INTRAMUSCULAR; INTRAVENOUS EVERY 5 MIN PRN
Status: DISCONTINUED | OUTPATIENT
Start: 2023-03-02 | End: 2023-03-02 | Stop reason: HOSPADM

## 2023-03-02 RX ORDER — HYDROMORPHONE HCL IN WATER/PF 6 MG/30 ML
0.2 PATIENT CONTROLLED ANALGESIA SYRINGE INTRAVENOUS EVERY 5 MIN PRN
Status: DISCONTINUED | OUTPATIENT
Start: 2023-03-02 | End: 2023-03-02 | Stop reason: HOSPADM

## 2023-03-02 RX ORDER — CEFAZOLIN SODIUM/WATER 2 G/20 ML
2 SYRINGE (ML) INTRAVENOUS
Status: DISCONTINUED | OUTPATIENT
Start: 2023-03-02 | End: 2023-03-02 | Stop reason: HOSPADM

## 2023-03-02 RX ORDER — OXYCODONE HYDROCHLORIDE 5 MG/1
5 TABLET ORAL
Status: DISCONTINUED | OUTPATIENT
Start: 2023-03-02 | End: 2023-03-02 | Stop reason: HOSPADM

## 2023-03-02 RX ORDER — ONDANSETRON 4 MG/1
4 TABLET, ORALLY DISINTEGRATING ORAL EVERY 30 MIN PRN
Status: DISCONTINUED | OUTPATIENT
Start: 2023-03-02 | End: 2023-03-02 | Stop reason: HOSPADM

## 2023-03-02 RX ORDER — ACETAMINOPHEN 325 MG/1
975 TABLET ORAL ONCE
Status: COMPLETED | OUTPATIENT
Start: 2023-03-02 | End: 2023-03-02

## 2023-03-02 RX ORDER — KETOROLAC TROMETHAMINE 15 MG/ML
15 INJECTION, SOLUTION INTRAMUSCULAR; INTRAVENOUS EVERY 6 HOURS PRN
Status: DISCONTINUED | OUTPATIENT
Start: 2023-03-02 | End: 2023-03-02 | Stop reason: HOSPADM

## 2023-03-02 RX ORDER — IBUPROFEN 600 MG/1
600 TABLET, FILM COATED ORAL ONCE
Status: DISCONTINUED | OUTPATIENT
Start: 2023-03-02 | End: 2023-03-02 | Stop reason: HOSPADM

## 2023-03-02 RX ADMIN — PHENYLEPHRINE HYDROCHLORIDE 100 MCG: 10 INJECTION INTRAVENOUS at 14:50

## 2023-03-02 RX ADMIN — KETOROLAC TROMETHAMINE 15 MG: 15 INJECTION, SOLUTION INTRAMUSCULAR; INTRAVENOUS at 15:59

## 2023-03-02 RX ADMIN — FENTANYL CITRATE 25 MCG: 50 INJECTION, SOLUTION INTRAMUSCULAR; INTRAVENOUS at 15:56

## 2023-03-02 RX ADMIN — PHENYLEPHRINE HYDROCHLORIDE 100 MCG: 10 INJECTION INTRAVENOUS at 14:41

## 2023-03-02 RX ADMIN — SODIUM CHLORIDE, POTASSIUM CHLORIDE, SODIUM LACTATE AND CALCIUM CHLORIDE: 600; 310; 30; 20 INJECTION, SOLUTION INTRAVENOUS at 12:48

## 2023-03-02 RX ADMIN — PHENYLEPHRINE HYDROCHLORIDE 100 MCG: 10 INJECTION INTRAVENOUS at 14:56

## 2023-03-02 RX ADMIN — ONDANSETRON 4 MG: 2 INJECTION INTRAMUSCULAR; INTRAVENOUS at 14:41

## 2023-03-02 RX ADMIN — ACETAMINOPHEN 975 MG: 325 TABLET, FILM COATED ORAL at 12:47

## 2023-03-02 RX ADMIN — GLYCOPYRROLATE 0.2 MG: 0.2 INJECTION, SOLUTION INTRAMUSCULAR; INTRAVENOUS at 14:45

## 2023-03-02 RX ADMIN — PHENYLEPHRINE HYDROCHLORIDE 100 MCG: 10 INJECTION INTRAVENOUS at 14:47

## 2023-03-02 RX ADMIN — LIDOCAINE HYDROCHLORIDE 100 MG: 20 INJECTION, SOLUTION INFILTRATION; PERINEURAL at 14:37

## 2023-03-02 RX ADMIN — FENTANYL CITRATE 100 MCG: 50 INJECTION, SOLUTION INTRAMUSCULAR; INTRAVENOUS at 14:37

## 2023-03-02 RX ADMIN — PROPOFOL 200 MG: 10 INJECTION, EMULSION INTRAVENOUS at 14:37

## 2023-03-02 RX ADMIN — Medication 2 G: at 14:26

## 2023-03-02 RX ADMIN — FENTANYL CITRATE 25 MCG: 50 INJECTION, SOLUTION INTRAMUSCULAR; INTRAVENOUS at 15:50

## 2023-03-02 ASSESSMENT — ACTIVITIES OF DAILY LIVING (ADL)
ADLS_ACUITY_SCORE: 37
ADLS_ACUITY_SCORE: 37

## 2023-03-02 ASSESSMENT — LIFESTYLE VARIABLES: TOBACCO_USE: 0

## 2023-03-02 NOTE — ANESTHESIA PROCEDURE NOTES
Airway       Patient location during procedure: OR       Procedure Start/Stop Times: 3/2/2023 2:43 PM  Staff -        Anesthesiologist:  Aaron Kidd MD       CRNA: Kenyatta Joya APRN CRNA       Performed By: CRNA  Consent for Airway        Urgency: elective  Indications and Patient Condition       Indications for airway management: elida-procedural         Mask difficulty assessment: 0 - not attempted    Final Airway Details       Final airway type: supraglottic airway    Supraglottic Airway Details        Type: LMA       Brand: I-Gel       LMA size: 4    Post intubation assessment        Placement verified by: capnometry, equal breath sounds and chest rise        Number of attempts at approach: 1       Secured with: commercial tube madrigal and pink tape       Ease of procedure: easy       Dentition: Intact and Unchanged    Medication(s) Administered   Medication Administration Time: 3/2/2023 2:43 PM

## 2023-03-02 NOTE — ANESTHESIA POSTPROCEDURE EVALUATION
Patient: Charlette Sharp    Procedure: Procedure(s):  HYSTEROSCOPY, WITH DILATION AND CURETTAGE       Anesthesia Type:  General    Note:  Disposition: Outpatient   Postop Pain Control: Uneventful            Sign Out: Well controlled pain   PONV: No   Neuro/Psych: Uneventful            Sign Out: Acceptable/Baseline neuro status   Airway/Respiratory: Uneventful            Sign Out: Acceptable/Baseline resp. status   CV/Hemodynamics: Uneventful            Sign Out: Acceptable CV status; No obvious hypovolemia; No obvious fluid overload   Other NRE: NONE   DID A NON-ROUTINE EVENT OCCUR? No           Last vitals:  Vitals Value Taken Time   BP 86/52 03/02/23 1516   Temp     Pulse 95 03/02/23 1523   Resp 16 03/02/23 1523   SpO2 96 % 03/02/23 1523   Vitals shown include unvalidated device data.    Electronically Signed By: Aaron Kidd MD  March 2, 2023  3:25 PM

## 2023-03-02 NOTE — DISCHARGE INSTRUCTIONS
Same Day Surgery Discharge Instructions for  Sedation and General Anesthesia     It's not unusual to feel dizzy, light-headed or faint for up to 24 hours after surgery or while taking pain medication.  If you have these symptoms: sit for a few minutes before standing and have someone assist you when you get up to walk or use the bathroom.    You should rest and relax for the next 24 hours. We recommend you make arrangements to have an adult stay with you for at least 24 hours after your discharge.  Avoid hazardous and strenuous activity.    DO NOT DRIVE any vehicle or operate mechanical equipment for 24 hours following the end of your surgery.  Even though you may feel normal, your reactions may be affected by the medication you have received.    Do not drink alcoholic beverages for 24 hours following surgery.     Slowly progress to your regular diet as you feel able. It's not unusual to feel nauseated and/or vomit after receiving anesthesia.  If you develop these symptoms, drink clear liquids (apple juice, ginger ale, broth, 7-up, etc. ) until you feel better.  If your nausea and vomiting persists for 24 hours, please notify your surgeon.      All narcotic pain medications, along with inactivity and anesthesia, can cause constipation. Drinking plenty of liquids and increasing fiber intake will help.    For any questions of a medical nature, call your surgeon.    Do not make important decisions for 24 hours.    If you had general anesthesia, you may have a sore throat for a couple of days related to the breathing tube used during surgery.  You may use Cepacol lozenges to help with this discomfort.  If it worsens or if you develop a fever, contact your surgeon.     If you feel your pain is not well managed with the pain medications prescribed by your surgeon, please contact your surgeon's office to let them know so they can address your concerns.              Today you were given 975 mg of Tylenol at 130 pm. The  recommended daily maximum dose is 4000 mg.        Today you received Toradol, an antiinflammatory medication similar to Ibuprofen.  You should not take other antiinflammatory medication, such as Ibuprofen, Motrin, Advil, Aleve, Naprosyn, etc until 10:00 PM.        Reasons to contact your surgeon:    Signs of possible infection: Check your incision daily for redness, swelling, warmth, red streaks or foul drainage.   Elevated temperature.  Pain not controlled with pain medication and/or rest.   Uncontrolled nausea or vomiting.  Any questions or concerns.         **If you have questions or concerns about your procedure,   call Dr. Rodney [phone]**

## 2023-03-02 NOTE — ANESTHESIA PREPROCEDURE EVALUATION
Prior to Admission medications    Medication Sig Start Date End Date Taking? Authorizing Provider   Ascorbic Acid (VITAMIN C PO) Take 1,000 mg by mouth daily    Yes Reported, Patient   ASPIRIN PO Take 81 mg by mouth daily   Yes Unknown, Entered By History   BIOTIN PO Take 1 tablet by mouth 2 times daily   Yes Unknown, Entered By History   calcium-magnesium (CALMAG) 500-250 MG TABS Take 1 tablet by mouth daily   Yes Reported, Patient   Cyanocobalamin (VITAMIN B 12 PO) Take 1,000 mcg by mouth Every Mon, Wed, Fri Morning    Yes Reported, Patient   ibuprofen (ADVIL/MOTRIN) 200 MG tablet Take 200-800 mg by mouth every 8 hours as needed for mild pain   Yes Unknown, Entered By History   insulin glargine (LANTUS PEN) 100 UNIT/ML pen Inject 37 Units Subcutaneous At Bedtime   Yes Reported, Patient   lisinopril (ZESTRIL) 5 MG tablet Take 5 mg by mouth daily   Yes Unknown, Entered By History   metFORMIN (GLUCOPHAGE-XR) 500 MG 24 hr tablet Take 1,000 mg by mouth 2 times daily (with meals)   Yes Unknown, Entered By History   omeprazole (PRILOSEC) 20 MG DR capsule Take 20 mg by mouth 2 times daily   Yes Unknown, Entered By History   oxyCODONE (ROXICODONE) 5 MG tablet Take 0.5 tablets (2.5 mg) by mouth every 6 hours as needed for severe pain 9/17/21  Yes Sal Jamison MD   pramipexole (MIRAPEX) 1 MG tablet Take 1 mg by mouth every morning   Yes Unknown, Entered By History   SERTRALINE HCL PO Take 50 mg by mouth daily   Yes Reported, Patient   simvastatin (ZOCOR) 40 MG tablet Take 40 mg by mouth At Bedtime   Yes Reported, Patient   tamsulosin (FLOMAX) 0.4 MG capsule Take 1 capsule (0.4 mg) by mouth daily 9/18/21  Yes Sal Jamison MD   traZODone (DESYREL) 50 MG tablet Take 50 mg by mouth At Bedtime   Yes Unknown, Entered By History   VITAMIN D, CHOLECALCIFEROL, PO Take 2,000 Units by mouth daily    Yes Unknown, Entered By History   KATHIEU-CHEYOLETTE MATA None Entered    Reported, Patient     Current Facility-Administered  Medications Ordered in Epic   Medication Dose Route Frequency Last Rate Last Admin     ceFAZolin Sodium (ANCEF) injection 2 g  2 g Intravenous Pre-Op/Pre-procedure x 1 dose         ceFAZolin Sodium (ANCEF) injection 2 g  2 g Intravenous See Admin Instructions         lactated ringers infusion   Intravenous Continuous 10 mL/hr at 23 1248 New Bag at 23 1248     No current [facility]-ordered outpatient medications on file.       lactated ringers 10 mL/hr at 23 1248     No results for input(s): ABO, RH in the last 28954 hours.  No results for input(s): HCG in the last 00068 hours.  No results found for this or any previous visit (from the past 744 hour(s)).Anesthesia Pre-Procedure Evaluation    Patient: Charlette Sharp   MRN: [id number] : 1950        Procedure : Procedure(s):  HYSTEROSCOPY, WITH DILATION AND CURETTAGE          Past Medical History:   Diagnosis Date     Anemia     Iron Deficiency     B12 deficiency      Chronic diarrhea      Depressive disorder      Diabetes mellitus (H)     Type 2     Diverticulitis      Gastro-oesophageal reflux disease      Hypercholesteraemia      Hyperlipidemia      Hypertension      Insomnia      Kidney stone      MS (multiple sclerosis) (H)     generalized weakness     Pancreatic pseudocyst      Pancreatitis      Restless legs      Vitamin D deficiency       Past Surgical History:   Procedure Laterality Date     APPENDECTOMY       CHOLECYSTECTOMY       COLONOSCOPY       LAPAROSCOPIC CRYOABLATION TUMOR KIDNEY  2013    Procedure: LAPAROSCOPIC CRYOABLATION TUMOR KIDNEY;;  Surgeon: Elijah Corona MD;  Location:  OR     LASER HOLMIUM LITHOTRIPSY URETER(S), INSERT STENT, COMBINED Left 2020    Procedure: CYSTOSCOPY, LEFT RETROGRADE, LEFT URETEROSCOPY, WITH LITHOTRIPSY USING LASER, STONE BASKETING, LEFT URETERAL STENT INSERTION;  Surgeon: Juan Luis Nagy MD;  Location:  OR     NEPHRECTOMY PARTIAL  2013    Procedure:  NEPHRECTOMY PARTIAL;  LAPAROSCOPIC RIGHT RENAL CRYOABLATION;  Surgeon: Elijah Corona MD;  Location: [facility] OR      No Known Allergies   Social History     Tobacco Use     Smoking status: Never     Smokeless tobacco: Never   Substance Use Topics     Alcohol use: Yes     Comment: 2 drinks, once weekly- socially      Wt Readings from Last 1 Encounters:   03/02/23 68.4 kg (150 lb 11.2 oz)        Anesthesia Evaluation   Pt has had prior anesthetic.     No history of anesthetic complications       ROS/MED HX  ENT/Pulmonary:    (-) tobacco use and sleep apnea   Neurologic: Comment: RLS treated      Cardiovascular:     (+) Dyslipidemia hypertension-----    METS/Exercise Tolerance:     Hematologic:       Musculoskeletal:       GI/Hepatic:     (+) GERD, Asymptomatic on medication,     Renal/Genitourinary:     (+) Nephrolithiasis ,     Endo:     (+) type II DM,     Psychiatric/Substance Use:       Infectious Disease:       Malignancy:       Other:            Physical Exam    Airway        Mallampati: I    Neck ROM: full     Respiratory Devices and Support         Dental       (+) Modest Abnormalities - crowns, retainers, 1 or 2 missing teeth      Cardiovascular   cardiovascular exam normal          Pulmonary   pulmonary exam normal                OUTSIDE LABS:  CBC:   Lab Results   Component Value Date    WBC 8.9 04/03/2022    WBC 9.9 09/15/2021    HGB 11.8 04/03/2022    HGB 11.0 (L) 09/15/2021    HCT 37.9 04/03/2022    HCT 35.8 09/15/2021     04/03/2022     09/15/2021     BMP:   Lab Results   Component Value Date     04/03/2022     09/16/2021    POTASSIUM 4.0 04/03/2022    POTASSIUM 3.4 09/16/2021    CHLORIDE 104 04/03/2022    CHLORIDE 111 (H) 09/16/2021    CO2 26 04/03/2022    CO2 29 09/16/2021    BUN 16 04/03/2022    BUN 15 09/16/2021    CR 0.78 04/03/2022    CR 0.80 09/16/2021     (H) 03/02/2023     (H) 04/03/2022     COAGS:   Lab Results   Component Value Date    PTT 33  04/10/2006    INR 1.09 04/10/2006     POC:   Lab Results   Component Value Date     (H) 08/28/2020     HEPATIC:   Lab Results   Component Value Date    ALBUMIN 3.6 04/03/2022    PROTTOTAL 7.0 04/03/2022    ALT 20 04/03/2022    AST 9 04/03/2022    ALKPHOS 90 04/03/2022    BILITOTAL 0.3 04/03/2022     OTHER:   Lab Results   Component Value Date    LACT 1.0 09/15/2021    A1C 7.5 (H) 09/15/2021    TATYANA 8.5 04/03/2022    MAG 1.8 09/13/2013    LIPASE 81 04/03/2022    AMYLASE 275 (H) 04/10/2006    TSH 1.55 03/28/2006       Anesthesia Plan    ASA Status:  2   NPO Status:  NPO Appropriate    Anesthesia Type: General.     - Airway: LMA              Consents    Anesthesia Plan(s) and associated risks, benefits, and realistic alternatives discussed. Questions answered and patient/representative(s) expressed understanding.    - Discussed:     - Discussed with:  Patient         Postoperative Care    Pain management: IV analgesics.   PONV prophylaxis: Ondansetron (or other 5HT-3)     Comments:                Aaron Kidd MD

## 2023-03-02 NOTE — ANESTHESIA CARE TRANSFER NOTE
Patient: Charlette Sharp    Procedure: Procedure(s):  HYSTEROSCOPY, WITH DILATION AND CURETTAGE       Diagnosis: Endometrial disorder [N85.9]  Diagnosis Additional Information: No value filed.    Anesthesia Type:   General     Note:    Oropharynx: oropharynx clear of all foreign objects  Level of Consciousness: awake  Oxygen Supplementation: face mask    Independent Airway: airway patency satisfactory and stable  Dentition: dentition unchanged  Vital Signs Stable: post-procedure vital signs reviewed and stable  Report to RN Given: handoff report given  Patient transferred to: PACU    Handoff Report: Identifed the Patient, Identified the Reponsible Provider, Reviewed the pertinent medical history, Discussed the surgical course, Reviewed Intra-OP anesthesia mangement and issues during anesthesia, Set expectations for post-procedure period and Allowed opportunity for questions and acknowledgement of understanding      Vitals:  Vitals Value Taken Time   /80    Temp     Pulse 86    Resp 12    SpO2 98        Electronically Signed By: AR Irene CRNA  March 2, 2023  3:10 PM

## 2023-03-03 LAB
PATH REPORT.COMMENTS IMP SPEC: NORMAL
PATH REPORT.COMMENTS IMP SPEC: NORMAL
PATH REPORT.FINAL DX SPEC: NORMAL
PATH REPORT.GROSS SPEC: NORMAL
PATH REPORT.MICROSCOPIC SPEC OTHER STN: NORMAL
PATH REPORT.RELEVANT HX SPEC: NORMAL
PHOTO IMAGE: NORMAL

## 2023-03-03 NOTE — OP NOTE
Procedure Date: 03/02/2023    PREOPERATIVE DIAGNOSIS:  Thickened endometrium.    POSTOPERATIVE DIAGNOSES:  Thickened endometrium.    SURGEON:  Deepika Rodney MD    ANESTHESIA:  General anesthesia with an LMA.    PROCEDURE:  Hysteroscopy and dilatation and curettage of the endometrium and polypectomy,    INDICATIONS FOR PROCEDURE:  The patient is a 72-year-old female who was evaluated by Dr. Beard with a history of nephrolithiasis.  CT scan of abdomen and pelvis revealed five left renal calculi in the endometrium, thick endometrium appeared thickened to 1 cm.  A pelvic ultrasound was also obtained 10/28/2002 and showed a 13 cm endometrial stripe with a small uterus.  She was seen in consultation in my office on 12/16/2022.  I did do a Pipelle endometrial biopsy and got atrophic endometrium.  I did explain to the patient that this probably represented a benign polyp and we could either follow it conservatively or consider hysteroscopy and D and C with possible polypectomy.  She elected for definitive surgery.    FINDINGS:  The patient did have a small uterus.  She had a small fundal polyp on hysteroscopy.  This appeared benign when it was removed.    DESCRIPTION OF PROCEDURE:  The patient was taken to the operating room.  Pneumatic compression stockings were placed on her lower extremities.  She was brought to the operating room and placed in a supine position.  General anesthesia with an LMA was utilized by Anesthesia.  Once her airway was protected, she was placed in high lithotomy position using well-padded Yellofin stirrups.  Her arms were left on arm boards.  She was prepped and draped in the usual sterile fashion.  A timeout was conducted and everyone agreed upon the procedure.     I inserted a Missy speculum into the vagina and easily visualized the cervix.  There was some minor prolapse.  The cervix was grasped at 12 o'clock.  I serially dilated the endocervical canal with half-size Hegar dilators.   We white balanced the hysteroscope and I introduced this into the endometrial cavity and saw just a small benign-appearing polyp in the anterior fundal region.  The hysteroscope was removed.  I did a D and C circumferentially around the entire endometrium and felt only atrophic walls and very little tissue was obtained.  I did introduce a polyp forceps and I was able to remove the polyp after several passes.  I again repeated the endometrial curettings.      Instruments were removed from her vagina.  The fluid deficit was minimal and her anesthesia was reversed.  She was taken to Recovery Room in stable condition.    Deepika Rodney MD        D: 2023   T: 2023   MT: [id number]    Name:     JENY WILLAMS  MRN:      [id number]        Account:        [id number]   :      1950           Procedure Date: 2023     Document: [id number]    cc:  MD Keisha Diamond DO

## 2023-04-01 ENCOUNTER — HEALTH MAINTENANCE LETTER (OUTPATIENT)
Age: 73
End: 2023-04-01

## 2023-07-30 ENCOUNTER — ANESTHESIA EVENT (OUTPATIENT)
Dept: SURGERY | Facility: CLINIC | Age: 73
End: 2023-07-30
Payer: COMMERCIAL

## 2023-07-30 ENCOUNTER — HOSPITAL ENCOUNTER (OUTPATIENT)
Facility: CLINIC | Age: 73
Setting detail: OBSERVATION
Discharge: HOME | End: 2023-07-31
Attending: EMERGENCY MEDICINE | Admitting: STUDENT IN AN ORGANIZED HEALTH CARE EDUCATION/TRAINING PROGRAM
Payer: COMMERCIAL

## 2023-07-30 ENCOUNTER — ANESTHESIA (OUTPATIENT)
Dept: SURGERY | Facility: CLINIC | Age: 73
End: 2023-07-30
Payer: COMMERCIAL

## 2023-07-30 ENCOUNTER — APPOINTMENT (OUTPATIENT)
Dept: GENERAL RADIOLOGY | Facility: CLINIC | Age: 73
End: 2023-07-30
Attending: STUDENT IN AN ORGANIZED HEALTH CARE EDUCATION/TRAINING PROGRAM
Payer: COMMERCIAL

## 2023-07-30 ENCOUNTER — APPOINTMENT (OUTPATIENT)
Dept: CT IMAGING | Facility: CLINIC | Age: 73
End: 2023-07-30
Attending: EMERGENCY MEDICINE
Payer: COMMERCIAL

## 2023-07-30 DIAGNOSIS — N20.0 KIDNEY STONE: ICD-10-CM

## 2023-07-30 DIAGNOSIS — K59.00 CONSTIPATION, UNSPECIFIED CONSTIPATION TYPE: ICD-10-CM

## 2023-07-30 DIAGNOSIS — R10.9 ABDOMINAL PAIN, UNSPECIFIED ABDOMINAL LOCATION: ICD-10-CM

## 2023-07-30 DIAGNOSIS — N20.0 KIDNEY STONES: Primary | ICD-10-CM

## 2023-07-30 DIAGNOSIS — N13.5 UPJ (URETEROPELVIC JUNCTION) OBSTRUCTION: ICD-10-CM

## 2023-07-30 DIAGNOSIS — N39.0 URINARY TRACT INFECTION WITHOUT HEMATURIA, SITE UNSPECIFIED: ICD-10-CM

## 2023-07-30 LAB
ALBUMIN SERPL BCG-MCNC: 3.9 G/DL (ref 3.5–5.2)
ALBUMIN UR-MCNC: NEGATIVE MG/DL
ALP SERPL-CCNC: 85 U/L (ref 35–104)
ALT SERPL W P-5'-P-CCNC: 11 U/L (ref 0–50)
ANION GAP SERPL CALCULATED.3IONS-SCNC: 13 MMOL/L (ref 7–15)
APPEARANCE UR: CLEAR
AST SERPL W P-5'-P-CCNC: 16 U/L (ref 0–45)
BACTERIA #/AREA URNS HPF: ABNORMAL /HPF
BASOPHILS # BLD AUTO: 0 10E3/UL (ref 0–0.2)
BASOPHILS NFR BLD AUTO: 0 %
BILIRUB SERPL-MCNC: 0.2 MG/DL
BILIRUB UR QL STRIP: NEGATIVE
BUN SERPL-MCNC: 15.1 MG/DL (ref 8–23)
CALCIUM SERPL-MCNC: 9 MG/DL (ref 8.8–10.2)
CHLORIDE SERPL-SCNC: 101 MMOL/L (ref 98–107)
COLOR UR AUTO: ABNORMAL
CREAT SERPL-MCNC: 0.79 MG/DL (ref 0.51–0.95)
DEPRECATED HCO3 PLAS-SCNC: 25 MMOL/L (ref 22–29)
EOSINOPHIL # BLD AUTO: 0.4 10E3/UL (ref 0–0.7)
EOSINOPHIL NFR BLD AUTO: 4 %
ERYTHROCYTE [DISTWIDTH] IN BLOOD BY AUTOMATED COUNT: 15.2 % (ref 10–15)
GFR SERPL CREATININE-BSD FRML MDRD: 79 ML/MIN/1.73M2
GLUCOSE BLDC GLUCOMTR-MCNC: 215 MG/DL (ref 70–99)
GLUCOSE BLDC GLUCOMTR-MCNC: 221 MG/DL (ref 70–99)
GLUCOSE BLDC GLUCOMTR-MCNC: 284 MG/DL (ref 70–99)
GLUCOSE SERPL-MCNC: 142 MG/DL (ref 70–99)
GLUCOSE UR STRIP-MCNC: NEGATIVE MG/DL
HBA1C MFR BLD: 8.9 %
HCT VFR BLD AUTO: 33.5 % (ref 35–47)
HGB BLD-MCNC: 10.4 G/DL (ref 11.7–15.7)
HGB UR QL STRIP: NEGATIVE
HOLD SPECIMEN: NORMAL
IMM GRANULOCYTES # BLD: 0.1 10E3/UL
IMM GRANULOCYTES NFR BLD: 1 %
KETONES UR STRIP-MCNC: NEGATIVE MG/DL
LEUKOCYTE ESTERASE UR QL STRIP: ABNORMAL
LYMPHOCYTES # BLD AUTO: 2.3 10E3/UL (ref 0.8–5.3)
LYMPHOCYTES NFR BLD AUTO: 23 %
MCH RBC QN AUTO: 24.1 PG (ref 26.5–33)
MCHC RBC AUTO-ENTMCNC: 31 G/DL (ref 31.5–36.5)
MCV RBC AUTO: 78 FL (ref 78–100)
MONOCYTES # BLD AUTO: 0.7 10E3/UL (ref 0–1.3)
MONOCYTES NFR BLD AUTO: 7 %
MUCOUS THREADS #/AREA URNS LPF: PRESENT /LPF
NEUTROPHILS # BLD AUTO: 6.7 10E3/UL (ref 1.6–8.3)
NEUTROPHILS NFR BLD AUTO: 65 %
NITRATE UR QL: NEGATIVE
NRBC # BLD AUTO: 0 10E3/UL
NRBC BLD AUTO-RTO: 0 /100
PH UR STRIP: 5 [PH] (ref 5–7)
PLATELET # BLD AUTO: 271 10E3/UL (ref 150–450)
POTASSIUM SERPL-SCNC: 3.8 MMOL/L (ref 3.4–5.3)
PROT SERPL-MCNC: 6.6 G/DL (ref 6.4–8.3)
RBC # BLD AUTO: 4.32 10E6/UL (ref 3.8–5.2)
RBC URINE: 4 /HPF
SODIUM SERPL-SCNC: 139 MMOL/L (ref 136–145)
SP GR UR STRIP: 1.02 (ref 1–1.03)
SQUAMOUS EPITHELIAL: 1 /HPF
UROBILINOGEN UR STRIP-MCNC: NORMAL MG/DL
WBC # BLD AUTO: 10.2 10E3/UL (ref 4–11)
WBC URINE: 25 /HPF

## 2023-07-30 PROCEDURE — 250N000011 HC RX IP 250 OP 636: Performed by: STUDENT IN AN ORGANIZED HEALTH CARE EDUCATION/TRAINING PROGRAM

## 2023-07-30 PROCEDURE — 87086 URINE CULTURE/COLONY COUNT: CPT | Performed by: EMERGENCY MEDICINE

## 2023-07-30 PROCEDURE — 250N000013 HC RX MED GY IP 250 OP 250 PS 637: Performed by: STUDENT IN AN ORGANIZED HEALTH CARE EDUCATION/TRAINING PROGRAM

## 2023-07-30 PROCEDURE — G0378 HOSPITAL OBSERVATION PER HR: HCPCS

## 2023-07-30 PROCEDURE — 710N000009 HC RECOVERY PHASE 1, LEVEL 1, PER MIN: Performed by: STUDENT IN AN ORGANIZED HEALTH CARE EDUCATION/TRAINING PROGRAM

## 2023-07-30 PROCEDURE — 82962 GLUCOSE BLOOD TEST: CPT

## 2023-07-30 PROCEDURE — 85025 COMPLETE CBC W/AUTO DIFF WBC: CPT | Performed by: EMERGENCY MEDICINE

## 2023-07-30 PROCEDURE — C1769 GUIDE WIRE: HCPCS | Performed by: STUDENT IN AN ORGANIZED HEALTH CARE EDUCATION/TRAINING PROGRAM

## 2023-07-30 PROCEDURE — 96374 THER/PROPH/DIAG INJ IV PUSH: CPT | Mod: 59

## 2023-07-30 PROCEDURE — 74420 UROGRAPHY RTRGR +-KUB: CPT | Mod: 26 | Performed by: STUDENT IN AN ORGANIZED HEALTH CARE EDUCATION/TRAINING PROGRAM

## 2023-07-30 PROCEDURE — 250N000013 HC RX MED GY IP 250 OP 250 PS 637: Performed by: INTERNAL MEDICINE

## 2023-07-30 PROCEDURE — 83036 HEMOGLOBIN GLYCOSYLATED A1C: CPT | Performed by: INTERNAL MEDICINE

## 2023-07-30 PROCEDURE — C2617 STENT, NON-COR, TEM W/O DEL: HCPCS | Performed by: STUDENT IN AN ORGANIZED HEALTH CARE EDUCATION/TRAINING PROGRAM

## 2023-07-30 PROCEDURE — 80053 COMPREHEN METABOLIC PANEL: CPT | Performed by: EMERGENCY MEDICINE

## 2023-07-30 PROCEDURE — 99214 OFFICE O/P EST MOD 30 MIN: CPT | Mod: 25 | Performed by: STUDENT IN AN ORGANIZED HEALTH CARE EDUCATION/TRAINING PROGRAM

## 2023-07-30 PROCEDURE — 36415 COLL VENOUS BLD VENIPUNCTURE: CPT | Performed by: EMERGENCY MEDICINE

## 2023-07-30 PROCEDURE — 250N000012 HC RX MED GY IP 250 OP 636 PS 637: Performed by: INTERNAL MEDICINE

## 2023-07-30 PROCEDURE — 96361 HYDRATE IV INFUSION ADD-ON: CPT

## 2023-07-30 PROCEDURE — 250N000011 HC RX IP 250 OP 636: Performed by: ANESTHESIOLOGY

## 2023-07-30 PROCEDURE — 81001 URINALYSIS AUTO W/SCOPE: CPT | Performed by: EMERGENCY MEDICINE

## 2023-07-30 PROCEDURE — 250N000009 HC RX 250

## 2023-07-30 PROCEDURE — 52332 CYSTOSCOPY AND TREATMENT: CPT | Mod: LT | Performed by: STUDENT IN AN ORGANIZED HEALTH CARE EDUCATION/TRAINING PROGRAM

## 2023-07-30 PROCEDURE — 96375 TX/PRO/DX INJ NEW DRUG ADDON: CPT

## 2023-07-30 PROCEDURE — 74177 CT ABD & PELVIS W/CONTRAST: CPT

## 2023-07-30 PROCEDURE — 250N000009 HC RX 250: Performed by: EMERGENCY MEDICINE

## 2023-07-30 PROCEDURE — 360N000083 HC SURGERY LEVEL 3 W/ FLUORO, PER MIN: Performed by: STUDENT IN AN ORGANIZED HEALTH CARE EDUCATION/TRAINING PROGRAM

## 2023-07-30 PROCEDURE — 258N000003 HC RX IP 258 OP 636: Performed by: EMERGENCY MEDICINE

## 2023-07-30 PROCEDURE — 258N000003 HC RX IP 258 OP 636

## 2023-07-30 PROCEDURE — 999N000179 XR SURGERY CARM FLUORO LESS THAN 5 MIN W STILLS: Mod: TC

## 2023-07-30 PROCEDURE — 370N000017 HC ANESTHESIA TECHNICAL FEE, PER MIN: Performed by: STUDENT IN AN ORGANIZED HEALTH CARE EDUCATION/TRAINING PROGRAM

## 2023-07-30 PROCEDURE — 250N000025 HC SEVOFLURANE, PER MIN: Performed by: STUDENT IN AN ORGANIZED HEALTH CARE EDUCATION/TRAINING PROGRAM

## 2023-07-30 PROCEDURE — 99222 1ST HOSP IP/OBS MODERATE 55: CPT | Performed by: INTERNAL MEDICINE

## 2023-07-30 PROCEDURE — 250N000011 HC RX IP 250 OP 636: Mod: JZ | Performed by: EMERGENCY MEDICINE

## 2023-07-30 PROCEDURE — 999N000141 HC STATISTIC PRE-PROCEDURE NURSING ASSESSMENT: Performed by: STUDENT IN AN ORGANIZED HEALTH CARE EDUCATION/TRAINING PROGRAM

## 2023-07-30 PROCEDURE — 258N000001 HC RX 258: Performed by: STUDENT IN AN ORGANIZED HEALTH CARE EDUCATION/TRAINING PROGRAM

## 2023-07-30 PROCEDURE — 250N000011 HC RX IP 250 OP 636: Mod: JZ

## 2023-07-30 PROCEDURE — 250N000009 HC RX 250: Performed by: STUDENT IN AN ORGANIZED HEALTH CARE EDUCATION/TRAINING PROGRAM

## 2023-07-30 PROCEDURE — 99285 EMERGENCY DEPT VISIT HI MDM: CPT | Mod: 25

## 2023-07-30 PROCEDURE — 272N000001 HC OR GENERAL SUPPLY STERILE: Performed by: STUDENT IN AN ORGANIZED HEALTH CARE EDUCATION/TRAINING PROGRAM

## 2023-07-30 PROCEDURE — 250N000011 HC RX IP 250 OP 636: Performed by: EMERGENCY MEDICINE

## 2023-07-30 PROCEDURE — 258N000003 HC RX IP 258 OP 636: Performed by: ANESTHESIOLOGY

## 2023-07-30 PROCEDURE — C1758 CATHETER, URETERAL: HCPCS | Performed by: STUDENT IN AN ORGANIZED HEALTH CARE EDUCATION/TRAINING PROGRAM

## 2023-07-30 DEVICE — STENT URETERAL POLARIS ULTRA 6FRX24CM M0061921320
Type: IMPLANTABLE DEVICE | Site: URETER | Status: NON-FUNCTIONAL
Removed: 2023-08-25

## 2023-07-30 RX ORDER — HYDROMORPHONE HCL IN WATER/PF 6 MG/30 ML
0.2 PATIENT CONTROLLED ANALGESIA SYRINGE INTRAVENOUS
Status: DISCONTINUED | OUTPATIENT
Start: 2023-07-30 | End: 2023-07-31 | Stop reason: HOSPADM

## 2023-07-30 RX ORDER — ONDANSETRON 2 MG/ML
4 INJECTION INTRAMUSCULAR; INTRAVENOUS EVERY 6 HOURS PRN
Status: DISCONTINUED | OUTPATIENT
Start: 2023-07-30 | End: 2023-07-31 | Stop reason: HOSPADM

## 2023-07-30 RX ORDER — TRAZODONE HYDROCHLORIDE 50 MG/1
50 TABLET, FILM COATED ORAL AT BEDTIME
Status: DISCONTINUED | OUTPATIENT
Start: 2023-07-30 | End: 2023-07-31 | Stop reason: HOSPADM

## 2023-07-30 RX ORDER — PANTOPRAZOLE SODIUM 40 MG/1
40 TABLET, DELAYED RELEASE ORAL 2 TIMES DAILY
Status: DISCONTINUED | OUTPATIENT
Start: 2023-07-30 | End: 2023-07-31 | Stop reason: HOSPADM

## 2023-07-30 RX ORDER — ONDANSETRON 2 MG/ML
INJECTION INTRAMUSCULAR; INTRAVENOUS PRN
Status: DISCONTINUED | OUTPATIENT
Start: 2023-07-30 | End: 2023-07-30

## 2023-07-30 RX ORDER — NICOTINE POLACRILEX 4 MG
15-30 LOZENGE BUCCAL
Status: DISCONTINUED | OUTPATIENT
Start: 2023-07-30 | End: 2023-07-31 | Stop reason: HOSPADM

## 2023-07-30 RX ORDER — IOPAMIDOL 755 MG/ML
500 INJECTION, SOLUTION INTRAVASCULAR ONCE
Status: COMPLETED | OUTPATIENT
Start: 2023-07-30 | End: 2023-07-30

## 2023-07-30 RX ORDER — ACETAMINOPHEN 325 MG/1
650 TABLET ORAL EVERY 6 HOURS PRN
Status: DISCONTINUED | OUTPATIENT
Start: 2023-07-30 | End: 2023-07-30

## 2023-07-30 RX ORDER — HYDROMORPHONE HYDROCHLORIDE 1 MG/ML
0.3 INJECTION, SOLUTION INTRAMUSCULAR; INTRAVENOUS; SUBCUTANEOUS ONCE
Status: COMPLETED | OUTPATIENT
Start: 2023-07-30 | End: 2023-07-30

## 2023-07-30 RX ORDER — OXYCODONE HYDROCHLORIDE 5 MG/1
10 TABLET ORAL EVERY 4 HOURS PRN
Status: DISCONTINUED | OUTPATIENT
Start: 2023-07-30 | End: 2023-07-31 | Stop reason: HOSPADM

## 2023-07-30 RX ORDER — HYDROMORPHONE HCL IN WATER/PF 6 MG/30 ML
0.2 PATIENT CONTROLLED ANALGESIA SYRINGE INTRAVENOUS EVERY 5 MIN PRN
Status: DISCONTINUED | OUTPATIENT
Start: 2023-07-30 | End: 2023-07-30 | Stop reason: HOSPADM

## 2023-07-30 RX ORDER — DEXTROSE MONOHYDRATE 25 G/50ML
25-50 INJECTION, SOLUTION INTRAVENOUS
Status: DISCONTINUED | OUTPATIENT
Start: 2023-07-30 | End: 2023-07-31 | Stop reason: HOSPADM

## 2023-07-30 RX ORDER — TAMSULOSIN HYDROCHLORIDE 0.4 MG/1
0.4 CAPSULE ORAL DAILY
Status: DISCONTINUED | OUTPATIENT
Start: 2023-07-30 | End: 2023-07-30

## 2023-07-30 RX ORDER — CEFAZOLIN SODIUM/WATER 2 G/20 ML
2 SYRINGE (ML) INTRAVENOUS
Status: COMPLETED | OUTPATIENT
Start: 2023-07-30 | End: 2023-07-30

## 2023-07-30 RX ORDER — HYDROMORPHONE HCL IN WATER/PF 6 MG/30 ML
0.4 PATIENT CONTROLLED ANALGESIA SYRINGE INTRAVENOUS EVERY 5 MIN PRN
Status: DISCONTINUED | OUTPATIENT
Start: 2023-07-30 | End: 2023-07-30 | Stop reason: HOSPADM

## 2023-07-30 RX ORDER — LIDOCAINE 40 MG/G
CREAM TOPICAL
Status: DISCONTINUED | OUTPATIENT
Start: 2023-07-30 | End: 2023-07-31 | Stop reason: HOSPADM

## 2023-07-30 RX ORDER — ONDANSETRON 2 MG/ML
4 INJECTION INTRAMUSCULAR; INTRAVENOUS EVERY 6 HOURS PRN
Status: DISCONTINUED | OUTPATIENT
Start: 2023-07-30 | End: 2023-07-30

## 2023-07-30 RX ORDER — NALOXONE HYDROCHLORIDE 0.4 MG/ML
0.2 INJECTION, SOLUTION INTRAMUSCULAR; INTRAVENOUS; SUBCUTANEOUS
Status: DISCONTINUED | OUTPATIENT
Start: 2023-07-30 | End: 2023-07-31 | Stop reason: HOSPADM

## 2023-07-30 RX ORDER — ONDANSETRON 4 MG/1
4 TABLET, ORALLY DISINTEGRATING ORAL EVERY 30 MIN PRN
Status: DISCONTINUED | OUTPATIENT
Start: 2023-07-30 | End: 2023-07-30 | Stop reason: HOSPADM

## 2023-07-30 RX ORDER — NALOXONE HYDROCHLORIDE 0.4 MG/ML
0.4 INJECTION, SOLUTION INTRAMUSCULAR; INTRAVENOUS; SUBCUTANEOUS
Status: DISCONTINUED | OUTPATIENT
Start: 2023-07-30 | End: 2023-07-31 | Stop reason: HOSPADM

## 2023-07-30 RX ORDER — ACETAMINOPHEN 650 MG/1
650 SUPPOSITORY RECTAL EVERY 6 HOURS PRN
Status: DISCONTINUED | OUTPATIENT
Start: 2023-07-30 | End: 2023-07-31 | Stop reason: HOSPADM

## 2023-07-30 RX ORDER — DEXAMETHASONE SODIUM PHOSPHATE 4 MG/ML
INJECTION, SOLUTION INTRA-ARTICULAR; INTRALESIONAL; INTRAMUSCULAR; INTRAVENOUS; SOFT TISSUE PRN
Status: DISCONTINUED | OUTPATIENT
Start: 2023-07-30 | End: 2023-07-30

## 2023-07-30 RX ORDER — SODIUM CHLORIDE, SODIUM LACTATE, POTASSIUM CHLORIDE, CALCIUM CHLORIDE 600; 310; 30; 20 MG/100ML; MG/100ML; MG/100ML; MG/100ML
INJECTION, SOLUTION INTRAVENOUS CONTINUOUS
Status: DISCONTINUED | OUTPATIENT
Start: 2023-07-30 | End: 2023-07-30 | Stop reason: HOSPADM

## 2023-07-30 RX ORDER — FENTANYL CITRATE 50 UG/ML
25 INJECTION, SOLUTION INTRAMUSCULAR; INTRAVENOUS EVERY 5 MIN PRN
Status: DISCONTINUED | OUTPATIENT
Start: 2023-07-30 | End: 2023-07-30 | Stop reason: HOSPADM

## 2023-07-30 RX ORDER — HYDROMORPHONE HCL IN WATER/PF 6 MG/30 ML
0.4 PATIENT CONTROLLED ANALGESIA SYRINGE INTRAVENOUS
Status: DISCONTINUED | OUTPATIENT
Start: 2023-07-30 | End: 2023-07-31 | Stop reason: HOSPADM

## 2023-07-30 RX ORDER — HYDRALAZINE HYDROCHLORIDE 20 MG/ML
2.5-5 INJECTION INTRAMUSCULAR; INTRAVENOUS EVERY 10 MIN PRN
Status: DISCONTINUED | OUTPATIENT
Start: 2023-07-30 | End: 2023-07-30 | Stop reason: HOSPADM

## 2023-07-30 RX ORDER — LISINOPRIL 5 MG/1
5 TABLET ORAL DAILY
Status: DISCONTINUED | OUTPATIENT
Start: 2023-07-30 | End: 2023-07-31 | Stop reason: HOSPADM

## 2023-07-30 RX ORDER — FENTANYL CITRATE 50 UG/ML
50 INJECTION, SOLUTION INTRAMUSCULAR; INTRAVENOUS EVERY 5 MIN PRN
Status: DISCONTINUED | OUTPATIENT
Start: 2023-07-30 | End: 2023-07-30 | Stop reason: HOSPADM

## 2023-07-30 RX ORDER — FENTANYL CITRATE 50 UG/ML
INJECTION, SOLUTION INTRAMUSCULAR; INTRAVENOUS PRN
Status: DISCONTINUED | OUTPATIENT
Start: 2023-07-30 | End: 2023-07-30

## 2023-07-30 RX ORDER — ACETAMINOPHEN 325 MG/1
650 TABLET ORAL EVERY 4 HOURS PRN
Qty: 100 TABLET | Refills: 0 | Status: SHIPPED | OUTPATIENT
Start: 2023-07-30

## 2023-07-30 RX ORDER — ACETAMINOPHEN 325 MG/1
975 TABLET ORAL ONCE
Status: DISCONTINUED | OUTPATIENT
Start: 2023-07-30 | End: 2023-07-30 | Stop reason: HOSPADM

## 2023-07-30 RX ORDER — ONDANSETRON 4 MG/1
4 TABLET, ORALLY DISINTEGRATING ORAL EVERY 6 HOURS PRN
Status: DISCONTINUED | OUTPATIENT
Start: 2023-07-30 | End: 2023-07-30

## 2023-07-30 RX ORDER — HYDROXYZINE HYDROCHLORIDE 10 MG/1
10 TABLET, FILM COATED ORAL EVERY 6 HOURS PRN
Qty: 30 TABLET | Refills: 0 | Status: SHIPPED | OUTPATIENT
Start: 2023-07-30

## 2023-07-30 RX ORDER — PRAMIPEXOLE DIHYDROCHLORIDE 0.5 MG/1
1 TABLET ORAL 2 TIMES DAILY
Status: DISCONTINUED | OUTPATIENT
Start: 2023-07-30 | End: 2023-07-31 | Stop reason: HOSPADM

## 2023-07-30 RX ORDER — CEFTRIAXONE 2 G/1
2 INJECTION, POWDER, FOR SOLUTION INTRAMUSCULAR; INTRAVENOUS EVERY 24 HOURS
Status: DISCONTINUED | OUTPATIENT
Start: 2023-07-31 | End: 2023-07-31 | Stop reason: HOSPADM

## 2023-07-30 RX ORDER — METFORMIN HCL 500 MG
1000 TABLET, EXTENDED RELEASE 24 HR ORAL 2 TIMES DAILY WITH MEALS
Status: DISCONTINUED | OUTPATIENT
Start: 2023-07-30 | End: 2023-07-31 | Stop reason: HOSPADM

## 2023-07-30 RX ORDER — ACETAMINOPHEN 325 MG/1
650 TABLET ORAL EVERY 6 HOURS PRN
Status: DISCONTINUED | OUTPATIENT
Start: 2023-07-30 | End: 2023-07-31 | Stop reason: HOSPADM

## 2023-07-30 RX ORDER — AMOXICILLIN 250 MG
1 CAPSULE ORAL 2 TIMES DAILY
Status: DISCONTINUED | OUTPATIENT
Start: 2023-07-30 | End: 2023-07-31 | Stop reason: HOSPADM

## 2023-07-30 RX ORDER — LIDOCAINE HYDROCHLORIDE 20 MG/ML
INJECTION, SOLUTION INFILTRATION; PERINEURAL PRN
Status: DISCONTINUED | OUTPATIENT
Start: 2023-07-30 | End: 2023-07-30

## 2023-07-30 RX ORDER — GLYCOPYRROLATE 0.2 MG/ML
INJECTION, SOLUTION INTRAMUSCULAR; INTRAVENOUS PRN
Status: DISCONTINUED | OUTPATIENT
Start: 2023-07-30 | End: 2023-07-30

## 2023-07-30 RX ORDER — PROCHLORPERAZINE MALEATE 5 MG
5 TABLET ORAL EVERY 6 HOURS PRN
Status: DISCONTINUED | OUTPATIENT
Start: 2023-07-30 | End: 2023-07-31 | Stop reason: HOSPADM

## 2023-07-30 RX ORDER — AMOXICILLIN 250 MG
2 CAPSULE ORAL 2 TIMES DAILY
Status: DISCONTINUED | OUTPATIENT
Start: 2023-07-30 | End: 2023-07-31 | Stop reason: HOSPADM

## 2023-07-30 RX ORDER — ONDANSETRON 4 MG/1
4-8 TABLET, ORALLY DISINTEGRATING ORAL EVERY 8 HOURS PRN
Qty: 10 TABLET | Refills: 0 | Status: SHIPPED | OUTPATIENT
Start: 2023-07-30

## 2023-07-30 RX ORDER — ONDANSETRON 4 MG/1
4 TABLET, ORALLY DISINTEGRATING ORAL EVERY 6 HOURS PRN
Status: DISCONTINUED | OUTPATIENT
Start: 2023-07-30 | End: 2023-07-31 | Stop reason: HOSPADM

## 2023-07-30 RX ORDER — HYDROXYZINE HYDROCHLORIDE 25 MG/1
25 TABLET, FILM COATED ORAL EVERY 6 HOURS PRN
Status: DISCONTINUED | OUTPATIENT
Start: 2023-07-30 | End: 2023-07-31 | Stop reason: HOSPADM

## 2023-07-30 RX ORDER — SIMVASTATIN 40 MG
40 TABLET ORAL AT BEDTIME
Status: DISCONTINUED | OUTPATIENT
Start: 2023-07-30 | End: 2023-07-31 | Stop reason: HOSPADM

## 2023-07-30 RX ORDER — METOPROLOL TARTRATE 1 MG/ML
1-2 INJECTION, SOLUTION INTRAVENOUS EVERY 5 MIN PRN
Status: DISCONTINUED | OUTPATIENT
Start: 2023-07-30 | End: 2023-07-30 | Stop reason: HOSPADM

## 2023-07-30 RX ORDER — FENTANYL CITRATE 50 UG/ML
50 INJECTION, SOLUTION INTRAMUSCULAR; INTRAVENOUS ONCE
Status: COMPLETED | OUTPATIENT
Start: 2023-07-30 | End: 2023-07-30

## 2023-07-30 RX ORDER — LIDOCAINE 40 MG/G
CREAM TOPICAL
Status: DISCONTINUED | OUTPATIENT
Start: 2023-07-30 | End: 2023-07-30 | Stop reason: HOSPADM

## 2023-07-30 RX ORDER — KETOROLAC TROMETHAMINE 15 MG/ML
15 INJECTION, SOLUTION INTRAMUSCULAR; INTRAVENOUS
Status: DISCONTINUED | OUTPATIENT
Start: 2023-07-30 | End: 2023-07-30 | Stop reason: HOSPADM

## 2023-07-30 RX ORDER — OXYCODONE HYDROCHLORIDE 5 MG/1
5 TABLET ORAL EVERY 4 HOURS PRN
Status: DISCONTINUED | OUTPATIENT
Start: 2023-07-30 | End: 2023-07-31 | Stop reason: HOSPADM

## 2023-07-30 RX ORDER — CEFAZOLIN SODIUM/WATER 2 G/20 ML
2 SYRINGE (ML) INTRAVENOUS SEE ADMIN INSTRUCTIONS
Status: DISCONTINUED | OUTPATIENT
Start: 2023-07-30 | End: 2023-07-30 | Stop reason: HOSPADM

## 2023-07-30 RX ORDER — ONDANSETRON 2 MG/ML
4 INJECTION INTRAMUSCULAR; INTRAVENOUS ONCE
Status: COMPLETED | OUTPATIENT
Start: 2023-07-30 | End: 2023-07-30

## 2023-07-30 RX ORDER — CEFTRIAXONE 1 G/1
1 INJECTION, POWDER, FOR SOLUTION INTRAMUSCULAR; INTRAVENOUS ONCE
Status: COMPLETED | OUTPATIENT
Start: 2023-07-30 | End: 2023-07-30

## 2023-07-30 RX ORDER — ONDANSETRON 2 MG/ML
4 INJECTION INTRAMUSCULAR; INTRAVENOUS EVERY 30 MIN PRN
Status: DISCONTINUED | OUTPATIENT
Start: 2023-07-30 | End: 2023-07-30 | Stop reason: HOSPADM

## 2023-07-30 RX ORDER — PROPOFOL 10 MG/ML
INJECTION, EMULSION INTRAVENOUS PRN
Status: DISCONTINUED | OUTPATIENT
Start: 2023-07-30 | End: 2023-07-30

## 2023-07-30 RX ADMIN — FENTANYL CITRATE 100 MCG: 50 INJECTION, SOLUTION INTRAMUSCULAR; INTRAVENOUS at 10:06

## 2023-07-30 RX ADMIN — SODIUM CHLORIDE, POTASSIUM CHLORIDE, SODIUM LACTATE AND CALCIUM CHLORIDE: 600; 310; 30; 20 INJECTION, SOLUTION INTRAVENOUS at 09:59

## 2023-07-30 RX ADMIN — OXYCODONE HYDROCHLORIDE 10 MG: 5 TABLET ORAL at 19:59

## 2023-07-30 RX ADMIN — PHENYLEPHRINE HYDROCHLORIDE 200 MCG: 10 INJECTION INTRAVENOUS at 10:21

## 2023-07-30 RX ADMIN — CEFTRIAXONE SODIUM 1 G: 1 INJECTION, POWDER, FOR SOLUTION INTRAMUSCULAR; INTRAVENOUS at 07:29

## 2023-07-30 RX ADMIN — METFORMIN HYDROCHLORIDE 1000 MG: 500 TABLET, EXTENDED RELEASE ORAL at 18:45

## 2023-07-30 RX ADMIN — SODIUM CHLORIDE 60 ML: 9 INJECTION, SOLUTION INTRAVENOUS at 06:23

## 2023-07-30 RX ADMIN — SENNOSIDES AND DOCUSATE SODIUM 1 TABLET: 50; 8.6 TABLET ORAL at 14:08

## 2023-07-30 RX ADMIN — HYDROXYZINE HYDROCHLORIDE 25 MG: 25 TABLET, FILM COATED ORAL at 14:09

## 2023-07-30 RX ADMIN — DEXAMETHASONE SODIUM PHOSPHATE 4 MG: 4 INJECTION, SOLUTION INTRA-ARTICULAR; INTRALESIONAL; INTRAMUSCULAR; INTRAVENOUS; SOFT TISSUE at 10:06

## 2023-07-30 RX ADMIN — PANTOPRAZOLE SODIUM 40 MG: 40 TABLET, DELAYED RELEASE ORAL at 19:50

## 2023-07-30 RX ADMIN — INSULIN ASPART 2 UNITS: 100 INJECTION, SOLUTION INTRAVENOUS; SUBCUTANEOUS at 15:08

## 2023-07-30 RX ADMIN — PANTOPRAZOLE SODIUM 40 MG: 40 TABLET, DELAYED RELEASE ORAL at 14:09

## 2023-07-30 RX ADMIN — ONDANSETRON 4 MG: 2 INJECTION INTRAMUSCULAR; INTRAVENOUS at 10:09

## 2023-07-30 RX ADMIN — GLYCOPYRROLATE 0.2 MG: 0.2 INJECTION, SOLUTION INTRAMUSCULAR; INTRAVENOUS at 10:06

## 2023-07-30 RX ADMIN — PHENYLEPHRINE HYDROCHLORIDE 100 MCG: 10 INJECTION INTRAVENOUS at 10:15

## 2023-07-30 RX ADMIN — Medication 2 G: at 09:59

## 2023-07-30 RX ADMIN — PROPOFOL 150 MG: 10 INJECTION, EMULSION INTRAVENOUS at 10:06

## 2023-07-30 RX ADMIN — TRAZODONE HYDROCHLORIDE 50 MG: 50 TABLET ORAL at 22:10

## 2023-07-30 RX ADMIN — SODIUM CHLORIDE 1000 ML: 9 INJECTION, SOLUTION INTRAVENOUS at 05:33

## 2023-07-30 RX ADMIN — HYDROMORPHONE HYDROCHLORIDE 0.3 MG: 1 INJECTION, SOLUTION INTRAMUSCULAR; INTRAVENOUS; SUBCUTANEOUS at 05:41

## 2023-07-30 RX ADMIN — PRAMIPEXOLE DIHYDROCHLORIDE 1 MG: 0.5 TABLET ORAL at 19:50

## 2023-07-30 RX ADMIN — OXYCODONE HYDROCHLORIDE 10 MG: 5 TABLET ORAL at 14:09

## 2023-07-30 RX ADMIN — SERTRALINE HYDROCHLORIDE 50 MG: 50 TABLET ORAL at 14:09

## 2023-07-30 RX ADMIN — PHENYLEPHRINE HYDROCHLORIDE 100 MCG: 10 INJECTION INTRAVENOUS at 10:11

## 2023-07-30 RX ADMIN — ONDANSETRON 4 MG: 2 INJECTION INTRAMUSCULAR; INTRAVENOUS at 05:31

## 2023-07-30 RX ADMIN — LIDOCAINE HYDROCHLORIDE 50 MG: 20 INJECTION, SOLUTION INFILTRATION; PERINEURAL at 10:06

## 2023-07-30 RX ADMIN — FENTANYL CITRATE 50 MCG: 50 INJECTION, SOLUTION INTRAMUSCULAR; INTRAVENOUS at 08:30

## 2023-07-30 RX ADMIN — IOPAMIDOL 80 ML: 755 INJECTION, SOLUTION INTRAVENOUS at 06:23

## 2023-07-30 RX ADMIN — INSULIN ASPART 3 UNITS: 100 INJECTION, SOLUTION INTRAVENOUS; SUBCUTANEOUS at 18:43

## 2023-07-30 RX ADMIN — LISINOPRIL 5 MG: 5 TABLET ORAL at 14:09

## 2023-07-30 RX ADMIN — SIMVASTATIN 40 MG: 40 TABLET, FILM COATED ORAL at 22:10

## 2023-07-30 RX ADMIN — INSULIN GLARGINE 32 UNITS: 100 INJECTION, SOLUTION SUBCUTANEOUS at 22:27

## 2023-07-30 ASSESSMENT — ACTIVITIES OF DAILY LIVING (ADL)
ADLS_ACUITY_SCORE: 33
ADLS_ACUITY_SCORE: 37
ADLS_ACUITY_SCORE: 37
ADLS_ACUITY_SCORE: 33
ADLS_ACUITY_SCORE: 33
ADLS_ACUITY_SCORE: 37

## 2023-07-30 NOTE — OP NOTE
OPERATIVE REPORT    PREOPERATIVE DIAGNOSIS: Left UPJ and kidney stones    POSTOPERATIVE DIAGNOSIS:  Same    PROCEDURES PERFORMED:   1. Cystourethroscopy with left retrograde pyelography  2. Placement of left ureteral stent.  3. Intraoperative interpretation of fluoroscopic imaging.     STAFF SURGEON: Young Arriaza MD, present for entire case.     ANESTHESIA: General    ESTIMATED BLOOD LOSS: 0 mL.     DRAINS: 6 Fr 24 cm Double J Stent      OPERATIVE INDICATIONS:   Charlette Sharp is a 73 year old female who presented with a left obstructing UPJ stone.  The patient was counseled on the alternatives, risks, and benefits and elected to proceed with the above stated procedure.    DESCRIPTION OF PROCEDURE:    After informed consent was obtained, the patient was taken to the operating room, and moved to the operating table.  After adequate anesthesia was induced, the patient was repositioned in dorsal lithotomy position and prepped and draped in the usual sterile fashion. A timeout was taken to confirm correct patient, procedure and laterality.     A 22-Syriac cystoscope was inserted into a well lubricated urethra. The urethra was unremarkable.  The bladder was free of tumors, stones or diverticuli.  The media was clear clear.  Bilateral ureteral orifices were orthotopic.  A Sensor guidewire was advanced into the left renal pelvis with the aid of a 5-Syriac open ended ureteral catheter.  A retrograde pyelogram demonstrated mild   hydronephrosis or filling defects.  The wire was replaced and a 6 Fr 24 cm Double J Stent was advanced over the guidewire under fluoroscopic guidance with a good curl in the renal pelvis and bladder.  The stent passed up easily with no appreciable resistance.  The bladder was then drained.    The patient tolerated the procedure well.  There were no complications.         PLAN:   - Admit overnight for monitoring  - Follow-up with Dr Arriaza for definitive stone management in next 2-3  gayathri Arriaza MD

## 2023-07-30 NOTE — ANESTHESIA CARE TRANSFER NOTE
Patient: Charlette Sharp    Procedure: Procedure(s):  CYSTOSCOPY, WITH RETROGRADE PYELOGRAM AND URETERAL STENT INSERTION       Diagnosis: Kidney stone [N20.0]  UPJ (ureteropelvic junction) obstruction [N13.5]  Diagnosis Additional Information: No value filed.    Anesthesia Type:   General     Note:    Oropharynx: oral airway in place  Level of Consciousness: drowsy  Oxygen Supplementation: face mask  Level of Supplemental Oxygen (L/min / FiO2): 6  Independent Airway: airway patency satisfactory and stable  Dentition: dentition unchanged  Vital Signs Stable: post-procedure vital signs reviewed and stable  Report to RN Given: handoff report given  Patient transferred to: PACU    Handoff Report: Identifed the Patient, Identified the Reponsible Provider, Reviewed the pertinent medical history, Discussed the surgical course, Reviewed Intra-OP anesthesia mangement and issues during anesthesia, Set expectations for post-procedure period and Allowed opportunity for questions and acknowledgement of understanding      Vitals:  Vitals Value Taken Time   /66 07/30/23 1029   Temp 97.2  F (36.2  C) 07/30/23 1029   Pulse 87 07/30/23 1030   Resp 14 07/30/23 1030   SpO2 100 % 07/30/23 1030   Vitals shown include unvalidated device data.    Electronically Signed By: AR Pineda CRNA  July 30, 2023  10:31 AM

## 2023-07-30 NOTE — PLAN OF CARE
ROOM # 204-1    Living Situation (if not independent, order SW consult):  [facility] [phone]    Activity level at baseline: independent  Activity level on admit: SBA    Who will be transporting you at discharge: [facility] [phone]    Patient registered to observation; given Patient Bill of Rights; given the opportunity to ask questions about observation status and their plan of care.  Patient has been oriented to the observation room, bathroom and call light is in place.    Discussed discharge goals and expectations with patient/family.

## 2023-07-30 NOTE — ANESTHESIA PREPROCEDURE EVALUATION
Anesthesia Pre-Procedure Evaluation    Patient: Charlette Sharp   MRN: [id number] : 1950        Procedure : Procedure(s):  CYSTOSCOPY, WITH RETROGRADE PYELOGRAM AND URETERAL STENT INSERTION          Past Medical History:   Diagnosis Date    Anemia     Iron Deficiency    B12 deficiency     Chronic diarrhea     Depressive disorder     Diabetes mellitus (H)     Type 2    Diverticulitis     Gastro-oesophageal reflux disease     Hypercholesteraemia     Hyperlipidemia     Hypertension     Insomnia     Kidney stone     MS (multiple sclerosis) (H)     generalized weakness    Pancreatic pseudocyst     Pancreatitis     Restless legs     Vitamin D deficiency       Past Surgical History:   Procedure Laterality Date    APPENDECTOMY      CHOLECYSTECTOMY      COLONOSCOPY      DILATION AND CURETTAGE, OPERATIVE HYSTEROSCOPY, COMBINED N/A 3/2/2023    Procedure: HYSTEROSCOPY, WITH DILATION AND CURETTAGE;  Surgeon: Deepika Rodney MD;  Location:  OR    LAPAROSCOPIC CRYOABLATION TUMOR KIDNEY  2013    Procedure: LAPAROSCOPIC CRYOABLATION TUMOR KIDNEY;;  Surgeon: Eliajh Corona MD;  Location:  OR    LASER HOLMIUM LITHOTRIPSY URETER(S), INSERT STENT, COMBINED Left 2020    Procedure: CYSTOSCOPY, LEFT RETROGRADE, LEFT URETEROSCOPY, WITH LITHOTRIPSY USING LASER, STONE BASKETING, LEFT URETERAL STENT INSERTION;  Surgeon: Juan Luis Nagy MD;  Location:  OR    NEPHRECTOMY PARTIAL  2013    Procedure: NEPHRECTOMY PARTIAL;  LAPAROSCOPIC RIGHT RENAL CRYOABLATION;  Surgeon: Elijah Corona MD;  Location: [facility] OR      No Known Allergies   Social History     Tobacco Use    Smoking status: Never    Smokeless tobacco: Never   Substance Use Topics    Alcohol use: Yes     Comment: 2 drinks, once weekly- socially      Wt Readings from Last 1 Encounters:   23 72.6 kg (160 lb)        Anesthesia Evaluation   Pt has had prior anesthetic.         ROS/MED HX  ENT/Pulmonary:  - neg pulmonary  ROS     Neurologic:  - neg neurologic ROS     Cardiovascular:     (+) Dyslipidemia hypertension- -   -  - -                                      METS/Exercise Tolerance:     Hematologic: Comments: Lab Test        07/30/23     04/03/22     09/15/21                       0524          1112          2045          WBC          10.2         8.9          9.9           HGB          10.4*        11.8         11.0*         MCV          78           84           86            PLT          271          268          244            Lab Test        07/30/23 03/02/23 03/02/23 04/03/22 09/16/21 09/16/21                       0524          1514          1240          1112          0850          0714          NA           139           --           --          137           --          143           POTASSIUM    3.8           --           --          4.0           --          3.4           CHLORIDE     101           --           --          104           --          111*          CO2          25            --           --          26            --          29            BUN          15.1          --           --          16            --          15            CR           0.79          --           --          0.78          --          0.80          ANIONGAP     13            --           --          7             --          3             TATYANA          9.0           --           --          8.5           --          7.6*          GLC          142*         155*         207*         218*           < >        117*           < > = values in this interval not displayed.                     (+)      anemia,          Musculoskeletal:  - neg musculoskeletal ROS     GI/Hepatic:     (+) GERD, Asymptomatic on medication,                  Renal/Genitourinary:     (+) renal disease, type: ARF,     Nephrolithiasis ,       Endo:     (+)  type II DM,                    Psychiatric/Substance Use:     (+) psychiatric history  depression       Infectious Disease:  - neg infectious disease ROS     Malignancy:  - neg malignancy ROS     Other:  - neg other ROS          Physical Exam    Airway        Mallampati: II   TM distance: > 3 FB   Neck ROM: full   Mouth opening: > 3 cm    Respiratory Devices and Support         Dental       (+) Minor Abnormalities - some fillings, tiny chips      Cardiovascular   cardiovascular exam normal          Pulmonary   pulmonary exam normal                OUTSIDE LABS:  CBC:   Lab Results   Component Value Date    WBC 10.2 07/30/2023    WBC 8.9 04/03/2022    HGB 10.4 (L) 07/30/2023    HGB 11.8 04/03/2022    HCT 33.5 (L) 07/30/2023    HCT 37.9 04/03/2022     07/30/2023     04/03/2022     BMP:   Lab Results   Component Value Date     07/30/2023     04/03/2022    POTASSIUM 3.8 07/30/2023    POTASSIUM 4.0 04/03/2022    CHLORIDE 101 07/30/2023    CHLORIDE 104 04/03/2022    CO2 25 07/30/2023    CO2 26 04/03/2022    BUN 15.1 07/30/2023    BUN 16 04/03/2022    CR 0.79 07/30/2023    CR 0.78 04/03/2022     (H) 07/30/2023     (H) 03/02/2023     COAGS:   Lab Results   Component Value Date    PTT 33 04/10/2006    INR 1.09 04/10/2006     POC:   Lab Results   Component Value Date     (H) 08/28/2020     HEPATIC:   Lab Results   Component Value Date    ALBUMIN 3.9 07/30/2023    PROTTOTAL 6.6 07/30/2023    ALT 11 07/30/2023    AST 16 07/30/2023    ALKPHOS 85 07/30/2023    BILITOTAL 0.2 07/30/2023     OTHER:   Lab Results   Component Value Date    LACT 1.0 09/15/2021    A1C 7.5 (H) 09/15/2021    TATYANA 9.0 07/30/2023    MAG 1.8 09/13/2013    LIPASE 81 04/03/2022    AMYLASE 275 (H) 04/10/2006    TSH 1.55 03/28/2006       Anesthesia Plan    ASA Status:  3       Anesthesia Type: General.     - Airway: LMA   Induction: Intravenous, Propofol.   Maintenance: Balanced.        Consents    Anesthesia Plan(s) and associated risks, benefits, and realistic alternatives discussed. Questions  answered and patient/representative(s) expressed understanding.     - Discussed:     - Discussed with:  Patient      - Extended Intubation/Ventilatory Support Discussed: No.      - Patient is DNR/DNI Status: No     Use of blood products discussed: No .     Postoperative Care    Pain management: IV analgesics.   PONV prophylaxis: Ondansetron (or other 5HT-3), Dexamethasone or Solumedrol     Comments:                Abdullahi Lopez MD

## 2023-07-30 NOTE — ANESTHESIA PROCEDURE NOTES
Airway       Patient location during procedure: OR  Staff -        CRNA: Earlene Espino APRN CRNA       Performed By: CRNA  Consent for Airway        Urgency: elective  Indications and Patient Condition       Indications for airway management: elida-procedural       Induction type:intravenous       Mask difficulty assessment: 0 - not attempted    Final Airway Details       Final airway type: supraglottic airway    Supraglottic Airway Details        Type: LMA       Brand: I-Gel       LMA size: 4    Post intubation assessment        Placement verified by: capnometry, equal breath sounds and chest rise        Number of attempts at approach: 1       Number of other approaches attempted: 0       Secured with: commercial tube madrigal       Ease of procedure: easy       Dentition: Unchanged

## 2023-07-30 NOTE — H&P
CC:  L flank pain, ureteral stone    HPI:  73 year old female with a history of type II diabetes, hypertension, hyperlipidemia, and MS who presented via EMS with LLQ/L flank pain. The patient reports having gradually worsening pain for the past two weeks. The pain radiates to her left back and is associated with nausea. She has been dry heaving, but has not vomited.     On presentation to the ER today, vital signs are stable.  No fever.  No hypoxia    Labs notable for hemoglobin 10.4.  Complete metabolic panel normal except for glucose slightly high at 142.  Urinalysis showed 25 white blood cells, 4 red blood cells, large leukocyte esterase.  Urine culture pending.    CT scan of the abdomen/pelvis showed a 7 mm stone at the left UPJ junction causing moderate hydronephrosis.  Inflammatory stranding of the left kidney and renal pelvis may be due to obstruction.  Superimposed infectious or inflammatory pyelitis is possible.    Urology was contacted by the ER provider.  Patient was taken to the operating room earlier today for cystoscopy with ureteral stent placement and is being registered to observation following her procedure.  Patient will be monitored overnight with anticipated discharge tomorrow    PMH:   Multiple sclerosis (HC) G35    History of pancreatitis Z87.19    Pancreatic pseudocyst K86.3    Benign essential hypertension I10    Hyperlipidemia LDL goal < 70 E78.5    Iron deficiency E61.1    Major depression in remission (HC) F32.5    Gastroesophageal reflux disease K21.9    Primary insomnia F51.01    Postmenopausal Z78.0    Restless legs syndrome (RLS) G25.81    Vitamin D deficiency E55.9    Uncontrolled type 2 diabetes mellitus, with long-term current use of insulin E11.9, Z79.4    B12 deficiency      Medications:  await pharmacy reconciliation of home med list.  Does use Lantus     FH:  NC    SH:  denies smoking or excessive ETOH use    ROS: Comprehensive greater than 10 point review of systems otherwise  "negative besides that detailed above    Physical exam:  /82 (BP Location: Left arm)   Pulse 78   Temp 98  F (36.7  C) (Oral)   Resp 16   Ht 1.549 m (5' 1\")   Wt 72.6 kg (160 lb)   SpO2 95%   BMI 30.23 kg/m       Last 24 hours Vitals signs,imaging,microbiology;laboratory results were reviewed by me in EPIC  GENERAL:  Comfortable.  PSYCH: pleasant, oriented, No acute distress.  HEART:  Normal S1, S2 with no edema.  LUNGS:  Clear to auscultation, normal Respiratory effort.  ABDOMEN:  Soft, no hepatosplenomegaly, normal bowel sounds.  SKIN:  Dry to touch, No rash.    Pertinent laboratory and imaging data reviewed above in HPI    Impression:  73 year old female with a history of type II diabetes, hypertension, hyperlipidemia, and MS who presented via EMS with LLQ/L flank pain.    On presentation to the ER, vital signs unremarkable.  No fever    Lab work-up included urinalysis showing 25 white blood cells with leukocyte esterase present.  Culture is pending.  Serum white blood cell count is normal    CT scan of the abdomen pelvis showed a 7 mm left ureteral stone with hydronephrosis present    Patient was taken to the operating room this morning by urology for cystoscopy and ureteral stent placement.  She is being registered to observation for overnight monitoring with anticipated discharge tomorrow    1.  7 mm left ureteral stone with hydronephrosis: Status post cystoscopy with ureteral stent placement today.  Post procedure management per urology with definitive stone management in several weeks    2.  Abnormal urinalysis with possible urinary tract infection.  No fever and not septic appearing    3.  Multiple sclerosis history.  Mobilizes with the use of a walker when outside her home.      4.  Diabetes mellitus history, managed with Lantus insulin    5.  Hypertension history    Plan:    1.  Registered observation    2.  Rocephin for possible UTI    3.  Monitor urine culture results    4.  Pain medication " as needed post procedure    5.  Resume appropriate home medications when clarified by pharmacy    6.  Anticipated discharged tomorrow assuming remains clinically stable after procedure today    CODE STATUS is full

## 2023-07-30 NOTE — PHARMACY-ADMISSION MEDICATION HISTORY
Pharmacist Admission Medication History    Admission medication history is complete. The information provided in this note is only as accurate as the sources available at the time of the update.    Medication reconciliation/reorder completed by provider prior to medication history? No    Information Source(s): Patient via in-person    Pertinent Information: patient had a med list/table on her ipad.    Changes made to PTA medication list:    Added: None    Deleted: ibuprofen, oxycodone, flomax    Changed: vit c, biotin, calcium, lantus, pramipexole    Medication Affordability:  Not including over the counter (OTC) medications, was there a time in the past 3 months when you did not take your medications as prescribed because of cost?: No    Allergies reviewed with patient and updates made in EHR: yes    Medication History Completed By: Rosalina Mcgee RP 7/30/2023 12:57 PM    Medication Sig Last Dose Taking?   acetaminophen (TYLENOL) 325 MG tablet Take 2 tablets (650 mg) by mouth every 4 hours as needed for other (mild pain)  Yes   Ascorbic Acid (VITAMIN C PO) Take 500 mg by mouth daily 7/29/2023 Yes   ASPIRIN PO Take 81 mg by mouth daily 7/29/2023 Yes   BIOTIN PO Take 1 tablet by mouth daily 7/29/2023 Yes   calcium carbonate (OS-TATYANA) 1500 (600 Ca) MG tablet Take 600 mg by mouth daily 7/29/2023 Yes   Cyanocobalamin (VITAMIN B 12 PO) Take 1,000 mcg by mouth Every Mon, Wed, Fri Morning  7/28/2023 Yes   hydrOXYzine (ATARAX) 10 MG tablet Take 1 tablet (10 mg) by mouth every 6 hours as needed for itching or anxiety (with pain, moderate pain)  Yes   insulin glargine (LANTUS PEN) 100 UNIT/ML pen Inject 32 Units Subcutaneous At Bedtime 7/29/2023 Yes   lisinopril (ZESTRIL) 5 MG tablet Take 5 mg by mouth daily 7/29/2023 Yes   metFORMIN (GLUCOPHAGE-XR) 500 MG 24 hr tablet Take 1,000 mg by mouth 2 times daily (with meals) 7/29/2023 Yes   omeprazole (PRILOSEC) 20 MG DR capsule Take 20 mg by mouth 2 times daily 7/29/2023  Yes   ondansetron (ZOFRAN ODT) 4 MG ODT tab Take 1-2 tablets (4-8 mg) by mouth every 8 hours as needed for nausea Dissolve ON the tongue.  Yes   pramipexole (MIRAPEX) 1 MG tablet Take 1 mg by mouth 2 times daily 7/29/2023 Yes   SERTRALINE HCL PO Take 50 mg by mouth daily 7/29/2023 Yes   simvastatin (ZOCOR) 40 MG tablet Take 40 mg by mouth At Bedtime 7/29/2023 Yes   traZODone (DESYREL) 50 MG tablet Take 50 mg by mouth At Bedtime 7/29/2023 Yes   VITAMIN D, CHOLECALCIFEROL, PO Take 2,000 Units by mouth daily  7/29/2023 Yes   SANDIE MATA None Entered

## 2023-07-30 NOTE — PLAN OF CARE
"PRIMARY DIAGNOSIS: S/P CYSTOSCOPY, WITH RETROGRADE PYELOGRAM AND URETERAL STENT INSERTION   OUTPATIENT/OBSERVATION GOALS TO BE MET BEFORE DISCHARGE:  1. Stable vital signs Yes /82 (BP Location: Left arm)   Pulse 78   Temp 98  F (36.7  C) (Oral)   Resp 16   Ht 1.549 m (5' 1\")   Wt 72.6 kg (160 lb)   SpO2 95%   BMI 30.23 kg/m      2. Tolerating diet:Yes  3. Pain controlled with oral pain medications:  no  4. Positive bowel sounds:  Yes  5. Voiding without difficulty:  Yes  6. Able to ambulate:  yes  7. Provider specific discharge goals met:  No    Discharge Planner Nurse   Safe discharge environment identified: Yes  Barriers to discharge: Yes       Entered by: Calli Crawford RN 07/30/2023    Pt arrived from PACU A&O x4, awake, talking, active. Was able to ambulate with walker SBA to the bathroom - voided a small amount of pink-tinged urine - states it was painful.  at bedside.   Trying sprite and saltine crackers. Pain tolerable at this time. Admitted for pain management and abx for UTI.   Please review provider order for any additional goals.   Nurse to notify provider when observation goals have been met and patient is ready for discharge.  "

## 2023-07-30 NOTE — CONSULTS
Urology Consult    Name:  Charlette Sharp  MRN:  [id number]  Age/: 73 year old, 1950    CC: Obstructing left kidney stones    HPI: Charlette Sharp is a(n) 73 year old female with a history of multiple prior kidney stones procedures and currently presenting with left flank pain.  On evaluation found to have obstructing left UPJ stones and 3 additional left kidney stones  She does not have any history of fever chills or rigor and denies any significant hematuria  Last procedure on 2022 with Dr. Beard of [facility].  Rapidly recurrent stone    Past Medical History:  Past Medical History:   Diagnosis Date    Anemia     Iron Deficiency    B12 deficiency     Chronic diarrhea     Depressive disorder     Diabetes mellitus (H)     Type 2    Diverticulitis     Gastro-oesophageal reflux disease     Hypercholesteraemia     Hyperlipidemia     Hypertension     Insomnia     Kidney stone     MS (multiple sclerosis) (H)     generalized weakness    Pancreatic pseudocyst     Pancreatitis     Restless legs     Vitamin D deficiency        Past Surgical History:  Past Surgical History:   Procedure Laterality Date    APPENDECTOMY      CHOLECYSTECTOMY      COLONOSCOPY      DILATION AND CURETTAGE, OPERATIVE HYSTEROSCOPY, COMBINED N/A 3/2/2023    Procedure: HYSTEROSCOPY, WITH DILATION AND CURETTAGE;  Surgeon: Deepika Rodney MD;  Location:  OR    LAPAROSCOPIC CRYOABLATION TUMOR KIDNEY  2013    Procedure: LAPAROSCOPIC CRYOABLATION TUMOR KIDNEY;;  Surgeon: Elijah Corona MD;  Location:  OR    LASER HOLMIUM LITHOTRIPSY URETER(S), INSERT STENT, COMBINED Left 2020    Procedure: CYSTOSCOPY, LEFT RETROGRADE, LEFT URETEROSCOPY, WITH LITHOTRIPSY USING LASER, STONE BASKETING, LEFT URETERAL STENT INSERTION;  Surgeon: Juan Luis Nagy MD;  Location:  OR    NEPHRECTOMY PARTIAL  2013    Procedure: NEPHRECTOMY PARTIAL;  LAPAROSCOPIC RIGHT RENAL CRYOABLATION;  Surgeon: Cj  Elijah Howe MD;  Location: [facility] OR       Allergies:   No Known Allergies    Medications:  No current facility-administered medications on file prior to encounter.  ACCU-CHEK ESPERANZA, None Entered  Ascorbic Acid (VITAMIN C PO), Take 1,000 mg by mouth daily   ASPIRIN PO, Take 81 mg by mouth daily  BIOTIN PO, Take 1 tablet by mouth 2 times daily  calcium-magnesium (CALMAG) 500-250 MG TABS, Take 1 tablet by mouth daily  Cyanocobalamin (VITAMIN B 12 PO), Take 1,000 mcg by mouth Every Mon, Wed, Fri Morning   ibuprofen (ADVIL/MOTRIN) 200 MG tablet, Take 200-800 mg by mouth every 8 hours as needed for mild pain  insulin glargine (LANTUS PEN) 100 UNIT/ML pen, Inject 37 Units Subcutaneous At Bedtime  lisinopril (ZESTRIL) 5 MG tablet, Take 5 mg by mouth daily  metFORMIN (GLUCOPHAGE-XR) 500 MG 24 hr tablet, Take 1,000 mg by mouth 2 times daily (with meals)  omeprazole (PRILOSEC) 20 MG DR capsule, Take 20 mg by mouth 2 times daily  oxyCODONE (ROXICODONE) 5 MG tablet, Take 0.5 tablets (2.5 mg) by mouth every 6 hours as needed for severe pain  pramipexole (MIRAPEX) 1 MG tablet, Take 1 mg by mouth every morning  SERTRALINE HCL PO, Take 50 mg by mouth daily  simvastatin (ZOCOR) 40 MG tablet, Take 40 mg by mouth At Bedtime  tamsulosin (FLOMAX) 0.4 MG capsule, Take 1 capsule (0.4 mg) by mouth daily  traZODone (DESYREL) 50 MG tablet, Take 50 mg by mouth At Bedtime  VITAMIN D, CHOLECALCIFEROL, PO, Take 2,000 Units by mouth daily         Social History:  Social History     Socioeconomic History    Marital status:      Spouse name: Not on file    Number of children: Not on file    Years of education: Not on file    Highest education level: Not on file   Occupational History    Not on file   Tobacco Use    Smoking status: Never    Smokeless tobacco: Never   Substance and Sexual Activity    Alcohol use: Yes     Comment: 2 drinks, once weekly- socially    Drug use: No    Sexual activity: Not on file   Other Topics Concern     "Parent/sibling w/ CABG, MI or angioplasty before 65F 55M? Not Asked   Social History Narrative    Not on file     Social Determinants of Health     Financial Resource Strain: Not on file   Food Insecurity: Not on file   Transportation Needs: Not on file   Physical Activity: Not on file   Stress: Not on file   Social Connections: Not on file   Intimate Partner Violence: Not on file   Housing Stability: Not on file       Family History:  History reviewed. No pertinent family history.    ROS:  The remainder of the complete ROS was negative unless noted in the HPI.    Exam:  BP (!) 143/80   Pulse 75   Temp (!) 96.7  F (35.9  C) (Temporal)   Resp 16   Ht 1.549 m (5' 1\")   Wt 72.6 kg (160 lb)   SpO2 97%   BMI 30.23 kg/m    General: Alert, interactive, & in pain  Resp: CTAB, no crackles or wheezes  Cardiac: Regular rate; extremities warm;     Abdomen: Soft, non  Tender  , nondistended.   .  : Normal   Extremities: No LE edema or obvious joint abnormalities  Skin: Warm and dry, no jaundice or rash    Labs:  Results for orders placed or performed during the hospital encounter of 07/30/23 (from the past 24 hour(s))   [place] Draw    Narrative    The following orders were created for panel order [place] Draw.  Procedure                               Abnormality         Status                     ---------                               -----------         ------                     Extra Red Top Tube[[id number]]                               Final result               Extra Green Top (Lithium...[[id number]]                      Final result               Extra Purple Top Tube[[id number]]                            Final result                 Please view results for these tests on the individual orders.   Extra Red Top Tube   Result Value Ref Range    Hold Specimen JIC    Extra Green Top (Lithium Heparin) Tube   Result Value Ref Range    Hold Specimen JIC    Extra Purple Top Tube   Result Value Ref Range    Hold Specimen " [facility]    CBC + differential    Narrative    The following orders were created for panel order CBC + differential.  Procedure                               Abnormality         Status                     ---------                               -----------         ------                     CBC with platelets and d...[[id number]]  Abnormal            Final result                 Please view results for these tests on the individual orders.   Comprehensive metabolic panel   Result Value Ref Range    Sodium 139 136 - 145 mmol/L    Potassium 3.8 3.4 - 5.3 mmol/L    Chloride 101 98 - 107 mmol/L    Carbon Dioxide (CO2) 25 22 - 29 mmol/L    Anion Gap 13 7 - 15 mmol/L    Urea Nitrogen 15.1 8.0 - 23.0 mg/dL    Creatinine 0.79 0.51 - 0.95 mg/dL    Calcium 9.0 8.8 - 10.2 mg/dL    Glucose 142 (H) 70 - 99 mg/dL    Alkaline Phosphatase 85 35 - 104 U/L    AST 16 0 - 45 U/L    ALT 11 0 - 50 U/L    Protein Total 6.6 6.4 - 8.3 g/dL    Albumin 3.9 3.5 - 5.2 g/dL    Bilirubin Total 0.2 <=1.2 mg/dL    GFR Estimate 79 >60 mL/min/1.73m2   CBC with platelets and differential   Result Value Ref Range    WBC Count 10.2 4.0 - 11.0 10e3/uL    RBC Count 4.32 3.80 - 5.20 10e6/uL    Hemoglobin 10.4 (L) 11.7 - 15.7 g/dL    Hematocrit 33.5 (L) 35.0 - 47.0 %    MCV 78 78 - 100 fL    MCH 24.1 (L) 26.5 - 33.0 pg    MCHC 31.0 (L) 31.5 - 36.5 g/dL    RDW 15.2 (H) 10.0 - 15.0 %    Platelet Count 271 150 - 450 10e3/uL    % Neutrophils 65 %    % Lymphocytes 23 %    % Monocytes 7 %    % Eosinophils 4 %    % Basophils 0 %    % Immature Granulocytes 1 %    NRBCs per 100 WBC 0 <1 /100    Absolute Neutrophils 6.7 1.6 - 8.3 10e3/uL    Absolute Lymphocytes 2.3 0.8 - 5.3 10e3/uL    Absolute Monocytes 0.7 0.0 - 1.3 10e3/uL    Absolute Eosinophils 0.4 0.0 - 0.7 10e3/uL    Absolute Basophils 0.0 0.0 - 0.2 10e3/uL    Absolute Immature Granulocytes 0.1 <=0.4 10e3/uL    Absolute NRBCs 0.0 10e3/uL   UA with Microscopic   Result Value Ref Range    Color Urine Light Yellow  Colorless, Straw, Light Yellow, Yellow    Appearance Urine Clear Clear    Glucose Urine Negative Negative mg/dL    Bilirubin Urine Negative Negative    Ketones Urine Negative Negative mg/dL    Specific Gravity Urine 1.019 1.003 - 1.035    Blood Urine Negative Negative    pH Urine 5.0 5.0 - 7.0    Protein Albumin Urine Negative Negative mg/dL    Urobilinogen Urine Normal Normal, 2.0 mg/dL    Nitrite Urine Negative Negative    Leukocyte Esterase Urine Large (A) Negative    Bacteria Urine Few (A) None Seen /HPF    Mucus Urine Present (A) None Seen /LPF    RBC Urine 4 (H) <=2 /HPF    WBC Urine 25 (H) <=5 /HPF    Squamous Epithelials Urine 1 <=1 /HPF   Abd/pelvis CT,  IV  contrast only TRAUMA / AAA    Narrative    EXAM: CT ABDOMEN PELVIS W CONTRAST  LOCATION: [facility]  DATE: 7/30/2023    INDICATION: LLQ pain  COMPARISON: 10/14/2022.  TECHNIQUE: CT scan of the abdomen and pelvis was performed following injection of IV contrast. Multiplanar reformats were obtained. Dose reduction techniques were used.  CONTRAST: 80mL Isovue 370    FINDINGS:   LOWER CHEST: Mild bibasilar atelectasis. Small hiatal hernia.    HEPATOBILIARY: Postcholecystectomy. Hepatic steatosis.    PANCREAS: Atrophic.    SPLEEN: Normal.    ADRENAL GLANDS: Normal.    KIDNEYS/BLADDER: 7 mm stone at the left ureteropelvic junction causes moderate hydronephrosis. Three additional nonobstructing left intrarenal stones measure 6 to 7 mm each in size. Asymmetric inflammatory stranding of the left kidney and renal pelvis.   Tiny probable benign cortical cysts of both kidneys. Otherwise homogeneous cortical enhancement of the kidneys. Decompressed bladder.    BOWEL: Colonic diverticulosis. No bowel obstruction or inflammation.    LYMPH NODES: Normal.    VASCULATURE: Unremarkable.    PELVIC ORGANS: Normal.    MUSCULOSKELETAL: Mild degenerative changes of the spine.      Impression    IMPRESSION:   1.  7 mm stone at the left ureteropelvic  junction causes moderate hydronephrosis. Inflammatory stranding of the left kidney and renal pelvis may be due to obstruction. Superimposed infectious or inflammatory pyelitis is possible. No specific findings for   pyelonephritis.  2.  Additional nonobstructive nephrolithiasis on the left.  3.  Colonic diverticulosis.  4.  Small hiatal hernia.  5.  Hepatic steatosis.  6.  Atrophic pancreas.   XR Surgery [facility] L/T 5 Min Fluoro w Stills    Narrative    This exam was marked as non-reportable because it will not be read by a   radiologist or a [place] non-radiologist provider.               Assessment and Plan: Charlette Sharp is a(n) 73 year old female who presents with prior history of kidney stones and current left sided sudden onset flank pain.  On evaluation she was found to have multiple left kidney stones with an obstructing stone in the left UPJ.  We discussed about significance of this and the need for further evaluation and treatment considering her significant pain which has not gotten better despite adequate analgesia.  I discussed with her that she will need a follow-up procedure to remove the stones and the stone burden cannot be removed completely today and she is likely to need possibly a percutaneous nephrolithotomy considering the stone burden combined is more than 2 cm.  I would want to go ahead and put in a ureteral stent today however this will help take care of the obstructing stone in the UPJ and relieve her symptoms.  We discussed about stent discomfort and she is aware of this in view of the fact that she has had stents in the past.  We also discussed about possible urinary tract infection and the need to inform us if she has any symptoms suggesting that.  She expressed understanding of the disease, current concerns, need for intervention.  She was agreeable to proceed ahead with stent placement and would like to stay in the hospital to manage her pain before she is discharged.  We would  have to have her admitted with the hospitalist service.    MD Young Sandoval MD  [facility]

## 2023-07-30 NOTE — ANESTHESIA POSTPROCEDURE EVALUATION
Patient: Charlette Sharp    Procedure: Procedure(s):  CYSTOSCOPY, WITH RETROGRADE PYELOGRAM AND URETERAL STENT INSERTION       Anesthesia Type:  General    Note:  Disposition: Outpatient   Postop Pain Control: Uneventful            Sign Out: Well controlled pain   PONV: No   Neuro/Psych: Uneventful            Sign Out: Acceptable/Baseline neuro status   Airway/Respiratory: Uneventful            Sign Out: Acceptable/Baseline resp. status   CV/Hemodynamics: Uneventful            Sign Out: Acceptable CV status; No obvious hypovolemia; No obvious fluid overload   Other NRE: NONE   DID A NON-ROUTINE EVENT OCCUR? No           Last vitals:  Vitals Value Taken Time   /80 07/30/23 1110   Temp 97.2  F (36.2  C) 07/30/23 1029   Pulse 82 07/30/23 1114   Resp 16 07/30/23 1114   SpO2 97 % 07/30/23 1114   Vitals shown include unvalidated device data.    Electronically Signed By: Abdullahi Lopez MD  July 30, 2023  12:07 PM

## 2023-07-30 NOTE — ED NOTES
"[facility]  ED Nurse Handoff Report    ED Chief complaint: Abdominal Pain (LLQ pain )  . ED Diagnosis:   Final diagnoses:   Abdominal pain, unspecified abdominal location   Urinary tract infection without hematuria, site unspecified   Kidney stone       Allergies: No Known Allergies    Code Status: Full Code    Activity level - Baseline/Home:  independent.  Activity Level - Current:   independent.   Lift room needed: No.   Bariatric: No   Needed: No   Isolation: No.   Infection: Not Applicable.     Respiratory status: Room air    Vital Signs (within 30 minutes):   Vitals:    07/30/23 0516 07/30/23 0545 07/30/23 0600 07/30/23 0615   BP: 137/83 138/80 121/81 (!) 141/76   Pulse: 68 75 77 70   Resp: 19      Temp: 98  F (36.7  C)      TempSrc: Oral      SpO2: 99% 95% 96% 98%   Weight: 72 kg (158 lb 11.7 oz)      Height: 1.549 m (5' 1\")          Cardiac Rhythm:  ,      Pain level:    Patient confused: No.   Patient Falls Risk: assistive device/personal items within reach and toileting schedule implemented.   Elimination Status: Has voided     Patient Report - Initial Complaint: Pt has been having left lower quadrant pain x2 week, radiated to the left mid back and dusty, alone with nauseous and diarrhea . Severe getting worse yesterday, 9/10 pain.  Focused Assessment: left lower quadrant pain and left back pain on palpation, denied urination symptoms      Abnormal Results:   Labs Ordered and Resulted from Time of ED Arrival to Time of ED Departure   COMPREHENSIVE METABOLIC PANEL - Abnormal       Result Value    Sodium 139      Potassium 3.8      Chloride 101      Carbon Dioxide (CO2) 25      Anion Gap 13      Urea Nitrogen 15.1      Creatinine 0.79      Calcium 9.0      Glucose 142 (*)     Alkaline Phosphatase 85      AST 16      ALT 11      Protein Total 6.6      Albumin 3.9      Bilirubin Total 0.2      GFR Estimate 79     ROUTINE UA WITH MICROSCOPIC - Abnormal    Color Urine Light " Yellow      Appearance Urine Clear      Glucose Urine Negative      Bilirubin Urine Negative      Ketones Urine Negative      Specific Gravity Urine 1.019      Blood Urine Negative      pH Urine 5.0      Protein Albumin Urine Negative      Urobilinogen Urine Normal      Nitrite Urine Negative      Leukocyte Esterase Urine Large (*)     Bacteria Urine Few (*)     Mucus Urine Present (*)     RBC Urine 4 (*)     WBC Urine 25 (*)     Squamous Epithelials Urine 1     CBC WITH PLATELETS AND DIFFERENTIAL - Abnormal    WBC Count 10.2      RBC Count 4.32      Hemoglobin 10.4 (*)     Hematocrit 33.5 (*)     MCV 78      MCH 24.1 (*)     MCHC 31.0 (*)     RDW 15.2 (*)     Platelet Count 271      % Neutrophils 65      % Lymphocytes 23      % Monocytes 7      % Eosinophils 4      % Basophils 0      % Immature Granulocytes 1      NRBCs per 100 WBC 0      Absolute Neutrophils 6.7      Absolute Lymphocytes 2.3      Absolute Monocytes 0.7      Absolute Eosinophils 0.4      Absolute Basophils 0.0      Absolute Immature Granulocytes 0.1      Absolute NRBCs 0.0     URINE CULTURE        Abd/pelvis CT,  IV  contrast only TRAUMA / AAA   Final Result   IMPRESSION:    1.  7 mm stone at the left ureteropelvic junction causes moderate hydronephrosis. Inflammatory stranding of the left kidney and renal pelvis may be due to obstruction. Superimposed infectious or inflammatory pyelitis is possible. No specific findings for    pyelonephritis.   2.  Additional nonobstructive nephrolithiasis on the left.   3.  Colonic diverticulosis.   4.  Small hiatal hernia.   5.  Hepatic steatosis.   6.  Atrophic pancreas.          Treatments provided: pain management   Family Comments:  at bedside   OBS brochure/video discussed/provided to patient:  Yes  ED Medications:   Medications   cefTRIAXone (ROCEPHIN) 1 g vial to attach to  mL bag for ADULTS or NS 50 mL bag for PEDS (has no administration in time range)   ondansetron (ZOFRAN) injection 4 mg  (4 mg Intravenous $Given 7/30/23 4436)   0.9% sodium chloride BOLUS (1,000 mLs Intravenous $New Bag 7/30/23 4471)   HYDROmorphone (PF) (DILAUDID) injection 0.3 mg (0.3 mg Intravenous $Given 7/30/23 8741)   CT Scan Flush (60 mLs Intravenous $Given 7/30/23 0623)   iopamidol (ISOVUE-370) solution 500 mL (80 mLs Intravenous $Given 7/30/23 0623)       Drips infusing:  No  For the majority of the shift this patient was Green.   Interventions performed were pain management .    Sepsis treatment initiated: No    Cares/treatment/interventions/medications to be completed following ED care: N/A    ED Nurse Name: Lo Asher RN  7:15 AM

## 2023-07-30 NOTE — ED PROVIDER NOTES
"  History     Chief Complaint:  Abdominal Pain (LLQ pain )       HPI   Charlette Sharp is a 73 year old female with a history of type II diabetes, hypertension, hyperlipidemia, and MS who presents via EMS with abdominal pain. The patient reports having gradually worsening left lower quadrant abdominal pain for the past couple of weeks. The pain radiates to her L. Lower back and is associated with nausea. She has been dry heaving, but has not vomited. Intermittent diarrhea, worst yesterday with about 4-5 episodes. No fever, chills, or bloody stools. No recent antibiotics, suspicious foods, or sick contact. No recent travel. EMS administered 50 mcg fentanyl en route.     Independent Historian:   None - Patient Only    Review of External Notes:   Reviewed office note from 4/17/23.     Medications:    Lisinopril  Metformin  Omeprazole  Pramipexole  Sertraline  Simvastatin  Trazodone  Oxycodone  Flomax    Past Medical History:    MS  SBO  Type II diabetes mellitus   GERD  Hypertension  Hyperlipidemia  Pancreatitis  Pancreatic pseudocyst  RLS  Kidney stones  Diverticulitis  Depressive disorder  Anemia  Renal mass  UPJ obstruction    Past Surgical History:  Appendectomy  Cholecystectomy  Dilation and curettage  Cryoablation, kidney tumor  Laser holmium lithotripsy with left ureteral stent placement  Partial nephrectomy      Physical Exam   Patient Vitals for the past 24 hrs:   BP Temp Temp src Pulse Resp SpO2 Height Weight   07/30/23 0600 121/81 -- -- 77 -- 96 % -- --   07/30/23 0545 138/80 -- -- 75 -- 95 % -- --   07/30/23 0516 137/83 98  F (36.7  C) Oral 68 19 99 % 1.549 m (5' 1\") 72 kg (158 lb 11.7 oz)        Physical Exam  Nursing note and vitals reviewed.  Constitutional: Well nourished.   Eyes: Conjunctiva normal.  Pupils are equal, round, and reactive to light.   ENT: Nose normal. Mucous membranes pink and moist.    Neck: Normal range of motion.  CVS: Normal rate, regular rhythm.  Normal heart sounds. "   Pulmonary: Lungs clear to auscultation bilaterally. No wheezes/rales/rhonchi.  GI: Abdomen soft. LLQ/suprapubic tenderness. No rigidity or guarding.  Mild L. CVA tenderness  MSK: No calf tenderness or swelling.  Neuro: Alert. Follows simple commands.  Skin: Skin is warm and dry. No rash noted.   Psychiatric: Normal affect.     Emergency Department Course     Imaging:  Abd/pelvis CT,  IV  contrast only TRAUMA / AAA   Final Result   IMPRESSION:    1.  7 mm stone at the left ureteropelvic junction causes moderate hydronephrosis. Inflammatory stranding of the left kidney and renal pelvis may be due to obstruction. Superimposed infectious or inflammatory pyelitis is possible. No specific findings for    pyelonephritis.   2.  Additional nonobstructive nephrolithiasis on the left.   3.  Colonic diverticulosis.   4.  Small hiatal hernia.   5.  Hepatic steatosis.   6.  Atrophic pancreas.         Report per radiology    Laboratory:  Labs Ordered and Resulted from Time of ED Arrival to Time of ED Departure   COMPREHENSIVE METABOLIC PANEL - Abnormal       Result Value    Sodium 139      Potassium 3.8      Chloride 101      Carbon Dioxide (CO2) 25      Anion Gap 13      Urea Nitrogen 15.1      Creatinine 0.79      Calcium 9.0      Glucose 142 (*)     Alkaline Phosphatase 85      AST 16      ALT 11      Protein Total 6.6      Albumin 3.9      Bilirubin Total 0.2      GFR Estimate 79     ROUTINE UA WITH MICROSCOPIC - Abnormal    Color Urine Light Yellow      Appearance Urine Clear      Glucose Urine Negative      Bilirubin Urine Negative      Ketones Urine Negative      Specific Gravity Urine 1.019      Blood Urine Negative      pH Urine 5.0      Protein Albumin Urine Negative      Urobilinogen Urine Normal      Nitrite Urine Negative      Leukocyte Esterase Urine Large (*)     Bacteria Urine Few (*)     Mucus Urine Present (*)     RBC Urine 4 (*)     WBC Urine 25 (*)     Squamous Epithelials Urine 1     CBC WITH PLATELETS AND  DIFFERENTIAL - Abnormal    WBC Count 10.2      RBC Count 4.32      Hemoglobin 10.4 (*)     Hematocrit 33.5 (*)     MCV 78      MCH 24.1 (*)     MCHC 31.0 (*)     RDW 15.2 (*)     Platelet Count 271      % Neutrophils 65      % Lymphocytes 23      % Monocytes 7      % Eosinophils 4      % Basophils 0      % Immature Granulocytes 1      NRBCs per 100 WBC 0      Absolute Neutrophils 6.7      Absolute Lymphocytes 2.3      Absolute Monocytes 0.7      Absolute Eosinophils 0.4      Absolute Basophils 0.0      Absolute Immature Granulocytes 0.1      Absolute NRBCs 0.0     URINE CULTURE      Emergency Department Course & Assessments:  Interventions:  Medications   ondansetron (ZOFRAN) injection 4 mg (4 mg Intravenous $Given 7/30/23 0531)   0.9% sodium chloride BOLUS (1,000 mLs Intravenous $New Bag 7/30/23 0533)   HYDROmorphone (PF) (DILAUDID) injection 0.3 mg (0.3 mg Intravenous $Given 7/30/23 0541)   CT Scan Flush (60 mLs Intravenous $Given 7/30/23 0623)   iopamidol (ISOVUE-370) solution 500 mL (80 mLs Intravenous $Given 7/30/23 0623)      Assessments:  0533 I obtained history and examined the patient as noted above.     Consultations/Discussion of Management or Tests:  I spoke to urology, Dr. Zuñiga    Disposition:  Patient signed out to Dr. Badillo pending admission    Impression & Plan      Medical Decision Making:  Patient is a 73-year-old female presenting with complaints of lower abdominal pain.  She is nontoxic, in no significant distress.  Labs overall reassuring without profound anemia or significant electrolyte derangement.  UA does suggest concerns for infection.  Urine culture sent.  She was given a dose of IV Rocephin during her time in the ED.  She is overall not septic appearing.  CT does show obstructing 7mm renal stone at UPJ with moderate hydronephrosis.  She remained hemodynamically stable, pain controlled though is to benefit from admission at this time with urology consultation and likely intervention  with stent placement.  She is NPO.  Call for admission signed out to my partner.     Diagnosis:    ICD-10-CM    1. UPJ (ureteropelvic junction) obstruction  N13.5 Case Request: CYSTOSCOPY, WITH RETROGRADE PYELOGRAM AND URETERAL STENT INSERTION     Case Request: CYSTOSCOPY, WITH RETROGRADE PYELOGRAM AND URETERAL STENT INSERTION      2. Abdominal pain, unspecified abdominal location  R10.9       3. Urinary tract infection without hematuria, site unspecified  N39.0       4. Kidney stone  N20.0 Case Request: CYSTOSCOPY, WITH RETROGRADE PYELOGRAM AND URETERAL STENT INSERTION     Case Request: CYSTOSCOPY, WITH RETROGRADE PYELOGRAM AND URETERAL STENT INSERTION           Discharge Medications:  New Prescriptions    No medications on file     Scribe Disclosure:  I, Azalia Razo, am serving as a scribe at 5:28 AM on 7/30/2023 to document services personally performed by Karon Cr DO based on my observations and the provider's statements to me.     7/30/2023   Karon Cr DO McDonald, Lindsey E, DO  07/30/23 0745

## 2023-07-30 NOTE — PLAN OF CARE
"PRIMARY DIAGNOSIS: S/P CYSTOSCOPY, WITH RETROGRADE PYELOGRAM AND URETERAL STENT INSERTION   OUTPATIENT/OBSERVATION GOALS TO BE MET BEFORE DISCHARGE:  1. Stable vital signs Yes /72 (BP Location: Right arm)   Pulse 70   Temp 98.3  F (36.8  C) (Oral)   Resp 16   Ht 1.549 m (5' 1\")   Wt 72.6 kg (160 lb)   SpO2 96%   BMI 30.23 kg/m      2. Tolerating diet:Yes  3. Pain controlled with oral pain medications:  yes  4. Positive bowel sounds:  Yes  5. Voiding without difficulty:  Yes  6. Able to ambulate:  yes  7. Provider specific discharge goals met:  No    Discharge Planner Nurse   Safe discharge environment identified: Yes  Barriers to discharge: Yes       Entered by: Calli Crawford RN 07/30/2023    Pt  A&O x4, SBA with gait belt and walker, regular diet, continuous pulse ox, BG checks ACHS. Pt pleasant demeanored and can make needs known. Has voided multiple times this afternoon and has tolerated reg diet.    Admitted for pain management and iv abx for UTI. Possible discharge tomorrow, Mon 7/31.  Please review provider order for any additional goals.   Nurse to notify provider when observation goals have been met and patient is ready for discharge.  "

## 2023-07-30 NOTE — ED TRIAGE NOTES
Pt has LLQ abd pain in last 2 wk, along with N/V/D getting worse today, rating 9/10 pain on route, received 50mcg fentynyl, 7/10 at arrival. Hx of diverticulitis.     Triage Assessment       Row Name 07/30/23 0518       Triage Assessment (Adult)    Airway WDL WDL       Respiratory WDL    Respiratory WDL WDL       Skin Circulation/Temperature WDL    Skin Circulation/Temperature WDL WDL       Cardiac WDL    Cardiac WDL WDL       Peripheral/Neurovascular WDL    Peripheral Neurovascular WDL WDL       Cognitive/Neuro/Behavioral WDL    Cognitive/Neuro/Behavioral WDL WDL

## 2023-07-31 VITALS
TEMPERATURE: 98 F | WEIGHT: 160 LBS | DIASTOLIC BLOOD PRESSURE: 59 MMHG | SYSTOLIC BLOOD PRESSURE: 92 MMHG | HEIGHT: 61 IN | OXYGEN SATURATION: 93 % | BODY MASS INDEX: 30.21 KG/M2 | RESPIRATION RATE: 18 BRPM | HEART RATE: 63 BPM

## 2023-07-31 LAB
BACTERIA UR CULT: NORMAL
GLUCOSE BLDC GLUCOMTR-MCNC: 178 MG/DL (ref 70–99)

## 2023-07-31 PROCEDURE — 250N000013 HC RX MED GY IP 250 OP 250 PS 637: Performed by: STUDENT IN AN ORGANIZED HEALTH CARE EDUCATION/TRAINING PROGRAM

## 2023-07-31 PROCEDURE — G0378 HOSPITAL OBSERVATION PER HR: HCPCS

## 2023-07-31 PROCEDURE — 99238 HOSP IP/OBS DSCHRG MGMT 30/<: CPT | Performed by: INTERNAL MEDICINE

## 2023-07-31 PROCEDURE — 82962 GLUCOSE BLOOD TEST: CPT

## 2023-07-31 PROCEDURE — 96376 TX/PRO/DX INJ SAME DRUG ADON: CPT

## 2023-07-31 PROCEDURE — 99212 OFFICE O/P EST SF 10 MIN: CPT | Performed by: PHYSICIAN ASSISTANT

## 2023-07-31 PROCEDURE — 250N000013 HC RX MED GY IP 250 OP 250 PS 637: Performed by: INTERNAL MEDICINE

## 2023-07-31 PROCEDURE — 250N000011 HC RX IP 250 OP 636: Mod: JZ | Performed by: INTERNAL MEDICINE

## 2023-07-31 RX ORDER — OXYCODONE HYDROCHLORIDE 5 MG/1
5 TABLET ORAL EVERY 6 HOURS PRN
Qty: 12 TABLET | Refills: 0 | Status: SHIPPED | OUTPATIENT
Start: 2023-07-31 | End: 2023-08-03

## 2023-07-31 RX ORDER — AMOXICILLIN 250 MG
1 CAPSULE ORAL DAILY
Qty: 20 TABLET | Refills: 1 | Status: ON HOLD | OUTPATIENT
Start: 2023-07-31 | End: 2023-08-25

## 2023-07-31 RX ADMIN — LISINOPRIL 5 MG: 5 TABLET ORAL at 07:52

## 2023-07-31 RX ADMIN — PRAMIPEXOLE DIHYDROCHLORIDE 1 MG: 0.5 TABLET ORAL at 07:52

## 2023-07-31 RX ADMIN — METFORMIN HYDROCHLORIDE 1000 MG: 500 TABLET, EXTENDED RELEASE ORAL at 07:52

## 2023-07-31 RX ADMIN — PANTOPRAZOLE SODIUM 40 MG: 40 TABLET, DELAYED RELEASE ORAL at 07:52

## 2023-07-31 RX ADMIN — SERTRALINE HYDROCHLORIDE 50 MG: 50 TABLET ORAL at 07:52

## 2023-07-31 RX ADMIN — OXYCODONE HYDROCHLORIDE 10 MG: 5 TABLET ORAL at 07:52

## 2023-07-31 RX ADMIN — CEFTRIAXONE 2 G: 2 INJECTION, POWDER, FOR SOLUTION INTRAMUSCULAR; INTRAVENOUS at 07:54

## 2023-07-31 ASSESSMENT — ACTIVITIES OF DAILY LIVING (ADL)
ADLS_ACUITY_SCORE: 33

## 2023-07-31 NOTE — PLAN OF CARE
PRIMARY DIAGNOSIS: s/p cysto with L stent  OUTPATIENT/OBSERVATION GOALS TO BE MET BEFORE DISCHARGE:  1. Stable vital signs Yes  2. Tolerating diet:Yes  3. Pain controlled with oral pain medications:  Yes  4. Positive bowel sounds:  Yes  5. Voiding without difficulty:  Yes  6. Able to ambulate:  Yes  7. Provider specific discharge goals met:  Yes    Discharge Planner Nurse   Safe discharge environment identified: Yes  Barriers to discharge: Yes       Entered by: Gerri Melo RN 07/30/2023 8:19 PM    Patient is A&Ox4, up SBA, tolerating oral pain meds, SL, tolerating a regular diet, urology following, intermittent abx, will continue to monitor       Please review provider order for any additional goals.   Nurse to notify provider when observation goals have been met and patient is ready for discharge.

## 2023-07-31 NOTE — PROGRESS NOTES
I saw and examined this patient today    I spoke to the urology PA today    Pt appears stable for discharge today

## 2023-07-31 NOTE — PROGRESS NOTES
OBSERVATION patient END time: 1100    Patient's After Visit Summary was reviewed with patient.   Patient verbalized understanding of After Visit Summary, recommended follow up and was given an opportunity to ask questions.   Discharge medications sent home with patient/family: YES   Discharged with spouse

## 2023-07-31 NOTE — DISCHARGE SUMMARY
"[facility]  Hospitalist Discharge Summary      Date of Admission:  7/30/2023  Date of Discharge:  7/31/2023 11:17 AM  Discharging Provider: Osvaldo Salcedo MD  Discharge Service: Hospitalist Service    Discharge Diagnoses   Ureteral stone, s/p ureteral stent placement this admission    PMH:  Hx of nephrolithiasis  type II diabetes  Hypertension  Hyperlipidemia,  Multiple sclerosis    Clinically Significant Risk Factors     # DMII: A1C = 8.9 % (Ref range: <5.7 %) within past 6 months  # Obesity: Estimated body mass index is 30.23 kg/m  as calculated from the following:    Height as of this encounter: 1.549 m (5' 1\").    Weight as of this encounter: 72.6 kg (160 lb).       Follow-ups Needed After Discharge   Follow-up Appointments     Follow-up and recommended labs and tests       Follow up with urology as instructed for further management of your   ureteral stent and stone            Unresulted Labs Ordered in the Past 30 Days of this Admission       No orders found for last 31 day(s).            Discharge Disposition   Discharged to home  Condition at discharge: Stable    Hospital Course   73 year old female with a history of type II diabetes, hypertension, hyperlipidemia, and MS who presented via EMS with LLQ/L flank pain. The patient reports having gradually worsening pain for the past two weeks.  CT scan of the abdomen/pelvis showed a 7 mm stone at the left UPJ junction causing moderate hydronephrosis.     The patient was admitted to observation status after she underwent cystoscopy with ureteral stent placement by urology.  She was empirically treated with antibiotics while hospitalized.  Urinalysis was mildly abnormal on presentation and urine culture ultimately grew less than 10,000 mixed urogenital jose.  She had no infectious symptoms while hospitalized and antibiotics were not continued on discharge as there is no sign of urinary tract infection.    The patient will follow-up " with urology for definitive stone management.    Consultations This Hospital Stay   None    Code Status   Full Code    Time Spent on this Encounter   I, Osvaldo Salcedo MD, personally saw the patient today and spent less than or equal to 30 minutes discharging this patient.       Osvaldo Salcedo MD  [facility] OBSERVATION DEPT  [address] [place] [address]  Phone: [phone]  ______________________________________________________________________    Physical Exam   Vital Signs: Temp: 98  F (36.7  C) Temp src: Oral BP: 92/59 Pulse: 63   Resp: 18 SpO2: 93 % O2 Device: None (Room air)    Weight: 160 lbs 0 oz  Awake, interactive, oriented  RRR  CTA bilaterally  NT/ND, soft  Primary Care Physician   [facility]    Discharge Orders      When to call - Contact Surgeon Team    You may experience symptoms that require follow-up before your scheduled appointment. Contact your Surgeon Team if you are concerned about pain control, large amount of bleeding, blood clots, constipation, or if you experience signs of infection (fever, growing tenderness at the surgery site, a large amount of drainage, severe pain, foul-smelling drainage, redness or swelling.     When to call - Reach out to Urgent Care    If you are experiencing uncontrolled Nausea and Vomiting, uncontrolled pain, inability to urinate and uncomfortable, and in need of immediate care, and you are NOT able to reach your Surgeon Team, go to an Urgent Care clinic. Do NOT go to the Emergency Room unless you have shortness of breath, chest pain, or other signs of a medical emergency.     When to call - Reasons to Call 911    Call 911 immediately if you experience sudden-onset chest pain, arm weakness/numbness, slurred speech, or shortness of breath     Symptoms - Fever Management    A low grade fever can be expected after surgery. Your Provider many have prescribed an Opioid pain medication that also contains  acetaminophen (TYLENOL) that may help with Fever management.  Do NOT take additional acetaminophen (TYLENOL) in combination with an Opioid/acetaminophen (TYLENOL) product. Read the labels on your Over The Counter (OTC) medications with care.     Symptoms - Reduced Urine Output    If it has been greater than 8 hours since you have urinated despite drinking plenty of water, call your Surgeon Team.     No driving or operating machinery    Do NOT drive any vehicle or operate mechanical equipment for 24 hours following the end of your surgery.  Even though you may feel normal, your reactions may be affected by Anesthesia medication you received.     No Alcohol    Do NOT drink alcoholic beverages for 24 hours following your surgery and while taking pain medications.     Diet Instructions    Follow your surgeon's orders for any diet restrictions.  If you did not receive any diet restrictions, you may drink clear liquids (apple juice, ginger ale, 7-up, broth, etc.), and progress to your regular diet as you feel able. It is important to stay well-hydrated after surgery and drink plenty of water.     Discharge Instructions - Comfort and Pain Management    Pain after surgery is normal and expected. You will have some amount of pain after surgery. Your pain will improve with time. There are several things you can do to help reduce your pain including: rest, ice, and using pain medications as needed. Use pain interventions and don't wait until pain level is out of control. Contact your Surgeon Team if you have pain that persists or worsens after surgery despite rest, ice, and taking your medication(s) as prescribed. You may have a dry mouth, a sore throat, muscles aches or trouble sleeping, and these symptoms should go away after 24 hours.     Discharge Instructions - Rest    Rest and relax for the next 24 hours. Make arrangements to have someone stay with you overnight, and avoid hazardous and strenuous activities.  Do NOT make  any important decisions for the next 24 hours.     Shower/Bathing - No restrictions, may shower after 24 hours    Shower/Bathing - No restrictions, may shower after 24 hours.     Reason for your hospital stay    Ureteral stone     Follow-up and recommended labs and tests     Follow up with urology as instructed for further management of your ureteral stent and stone     Activity    Your activity upon discharge: activity as tolerated     Diet    Follow this diet upon discharge: regular diet       Significant Results and Procedures   Results for orders placed or performed during the hospital encounter of 07/30/23   Abd/pelvis CT,  IV  contrast only TRAUMA / AAA    Narrative    EXAM: CT ABDOMEN PELVIS W CONTRAST  LOCATION: [facility]  DATE: 7/30/2023    INDICATION: LLQ pain  COMPARISON: 10/14/2022.  TECHNIQUE: CT scan of the abdomen and pelvis was performed following injection of IV contrast. Multiplanar reformats were obtained. Dose reduction techniques were used.  CONTRAST: 80mL Isovue 370    FINDINGS:   LOWER CHEST: Mild bibasilar atelectasis. Small hiatal hernia.    HEPATOBILIARY: Postcholecystectomy. Hepatic steatosis.    PANCREAS: Atrophic.    SPLEEN: Normal.    ADRENAL GLANDS: Normal.    KIDNEYS/BLADDER: 7 mm stone at the left ureteropelvic junction causes moderate hydronephrosis. Three additional nonobstructing left intrarenal stones measure 6 to 7 mm each in size. Asymmetric inflammatory stranding of the left kidney and renal pelvis.   Tiny probable benign cortical cysts of both kidneys. Otherwise homogeneous cortical enhancement of the kidneys. Decompressed bladder.    BOWEL: Colonic diverticulosis. No bowel obstruction or inflammation.    LYMPH NODES: Normal.    VASCULATURE: Unremarkable.    PELVIC ORGANS: Normal.    MUSCULOSKELETAL: Mild degenerative changes of the spine.      Impression    IMPRESSION:   1.  7 mm stone at the left ureteropelvic junction causes moderate hydronephrosis.  Inflammatory stranding of the left kidney and renal pelvis may be due to obstruction. Superimposed infectious or inflammatory pyelitis is possible. No specific findings for   pyelonephritis.  2.  Additional nonobstructive nephrolithiasis on the left.  3.  Colonic diverticulosis.  4.  Small hiatal hernia.  5.  Hepatic steatosis.  6.  Atrophic pancreas.   XR Surgery MONTY L/T 5 Min Fluoro w Stills    Narrative    This exam was marked as non-reportable because it will not be read by a   radiologist or a [place] non-radiologist provider.             Discharge Medications   Discharge Medication List as of 7/31/2023 10:46 AM        START taking these medications    Details   acetaminophen (TYLENOL) 325 MG tablet Take 2 tablets (650 mg) by mouth every 4 hours as needed for other (mild pain), Disp-100 tablet, R-0, E-Prescribe      hydrOXYzine (ATARAX) 10 MG tablet Take 1 tablet (10 mg) by mouth every 6 hours as needed for itching or anxiety (with pain, moderate pain), Disp-30 tablet, R-0, E-Prescribe      ondansetron (ZOFRAN ODT) 4 MG ODT tab Take 1-2 tablets (4-8 mg) by mouth every 8 hours as needed for nausea Dissolve ON the tongue., Disp-10 tablet, R-0, E-Prescribe      oxyCODONE (ROXICODONE) 5 MG tablet Take 1 tablet (5 mg) by mouth every 6 hours as needed for pain, Disp-12 tablet, R-0, E-Prescribe      senna-docusate (SENOKOT-S/PERICOLACE) 8.6-50 MG tablet Take 1 tablet by mouth daily Take while using oxycodone for pain management to prevent constipation., Disp-20 tablet, R-1, E-Prescribe           CONTINUE these medications which have NOT CHANGED    Details   ACCU-CHEK ESPERANZA None Entered, Historical      Ascorbic Acid (VITAMIN C PO) Take 500 mg by mouth daily, Historical      ASPIRIN PO Take 81 mg by mouth daily, Historical      BIOTIN PO Take 1 tablet by mouth daily, Historical      calcium carbonate (OS-TATYANA) 1500 (600 Ca) MG tablet Take 600 mg by mouth daily, Historical      Cyanocobalamin (VITAMIN B 12 PO) Take  1,000 mcg by mouth Every Mon, Wed, Fri Morning , Historical      insulin glargine (LANTUS PEN) 100 UNIT/ML pen Inject 32 Units Subcutaneous At Bedtime, HistoricalIf Lantus is not covered by insurance, may substitute Basaglar at same dose and frequency.        lisinopril (ZESTRIL) 5 MG tablet Take 5 mg by mouth daily, Historical      metFORMIN (GLUCOPHAGE-XR) 500 MG 24 hr tablet Take 1,000 mg by mouth 2 times daily (with meals), Historical      omeprazole (PRILOSEC) 20 MG DR capsule Take 20 mg by mouth 2 times daily, Historical      pramipexole (MIRAPEX) 1 MG tablet Take 1 mg by mouth 2 times daily, Historical      SERTRALINE HCL PO Take 50 mg by mouth daily, Historical      simvastatin (ZOCOR) 40 MG tablet Take 40 mg by mouth At Bedtime, Historical      traZODone (DESYREL) 50 MG tablet Take 50 mg by mouth At Bedtime, Historical      VITAMIN D, CHOLECALCIFEROL, PO Take 2,000 Units by mouth daily , Historical           Allergies   No Known Allergies

## 2023-07-31 NOTE — PLAN OF CARE
PRIMARY DIAGNOSIS: s/p cysto with L stent plcement  OUTPATIENT/OBSERVATION GOALS TO BE MET BEFORE DISCHARGE:  1. Stable vital signs Yes  2. Tolerating diet:Yes  3. Pain controlled with oral pain medications:  Yes  4. Positive bowel sounds:  Yes  5. Voiding without difficulty:  Yes  6. Able to ambulate:  Yes  7. Provider specific discharge goals met:  Yes    Discharge Planner Nurse   Safe discharge environment identified: Yes  Barriers to discharge: Yes       Entered by: Gerri Melo RN 07/31/2023 12:03 AM    Patient is A&Ox4, up SBA, tolerating oral pain meds, tolerating a regular diet, urology following, will continue to monitor       Please review provider order for any additional goals.   Nurse to notify provider when observation goals have been met and patient is ready for discharge.

## 2023-07-31 NOTE — PLAN OF CARE
PRIMARY DIAGNOSIS: s/p cysto with L stent placement   OUTPATIENT/OBSERVATION GOALS TO BE MET BEFORE DISCHARGE:  1. Stable vital signs Yes  2. Tolerating diet:Yes  3. Pain controlled with oral pain medications:  Yes  4. Positive bowel sounds:  Yes  5. Voiding without difficulty:  Yes  6. Able to ambulate:  Yes  7. Provider specific discharge goals met:  Yes    Discharge Planner Nurse   Safe discharge environment identified: Yes  Barriers to discharge: Yes       Entered by: Gerri Melo RN 07/31/2023 4:54 AM      Patient is A&Ox4, up SBA, tolerating oral pain meds, voiding adequately, will continue to monitor        Please review provider order for any additional goals.   Nurse to notify provider when observation goals have been met and patient is ready for discharge

## 2023-07-31 NOTE — PROGRESS NOTES
[facility] Progress Note          Assessment and Plan:     Assessment:    POD 1 Cystourethroscopy with left retrograde pyelography, placement of left ureteral stent.     UPJ (ureteropelvic junction) obstruction    Kidney stone    Abdominal pain, unspecified abdominal location    Urinary tract infection without hematuria, site unspecified      Plan:   -Continue with indwelling ureteral stent.  -Possible side effects with an indwelling ureteral stent such as urgency and frequency of urination, dysuria, hematuria, symptoms of urine reflux, and some achiness in the side. Indwelling ureteral stents need to be exchanged every three months or removed by three months.    -Will need follow-up procedure in several weeks.  Discussed possible ureteroscopy with laser lithotripsy with possible multiple procedures versus PCNL.  Will discuss with Dr. Arriaza about plan.  Patient will also follow-up with her previous urologist to see what availability they had for treatment.  -Urine culture at growth of only mixed jose.  Received several doses of IV Rocephin.  -Plan on discharge later today.  Discussed with hospitalist.    Gianna Solitario PA-C   [facility]  [phone]               Interval History:     Doing well.  Endorses reflux, dysuria, urgency, and frequency.  UC: <10,000 mixed jose.  On IV Rocephin.  Denies N/V/F/C/SOB/CP.   Afebrile without tachycardia.   at bedside.              Review of Systems:     The 5 point Review of Systems is negative other than noted in the HPI             Medications:     Current Facility-Administered Medications Ordered in Epic   Medication Dose Route Frequency Last Rate Last Admin    acetaminophen (TYLENOL) tablet 650 mg  650 mg Oral Q6H PRN        Or    acetaminophen (TYLENOL) Suppository 650 mg  650 mg Rectal Q6H PRN        cefTRIAXone (ROCEPHIN) 2 g vial to attach to  ml bag for ADULTS or NS 50 ml bag for PEDS  2 g Intravenous Q24H   2 g at  07/31/23 0754    glucose gel 15-30 g  15-30 g Oral Q15 Min PRN        Or    dextrose 50 % injection 25-50 mL  25-50 mL Intravenous Q15 Min PRN        Or    glucagon injection 1 mg  1 mg Subcutaneous Q15 Min PRN        HYDROmorphone (DILAUDID) injection 0.2 mg  0.2 mg Intravenous Q2H PRN        Or    HYDROmorphone (DILAUDID) injection 0.4 mg  0.4 mg Intravenous Q2H PRN        hydrOXYzine (ATARAX) tablet 25 mg  25 mg Oral Q6H PRN   25 mg at 07/30/23 1409    insulin aspart (NovoLOG) injection (RAPID ACTING)  1-7 Units Subcutaneous TID AC   3 Units at 07/30/23 1843    insulin aspart (NovoLOG) injection (RAPID ACTING)  1-5 Units Subcutaneous At Bedtime   1 Units at 07/30/23 2210    insulin glargine (LANTUS PEN) injection 32 Units  32 Units Subcutaneous At Bedtime   32 Units at 07/30/23 2227    lidocaine (LMX4) cream   Topical Q1H PRN        lidocaine 1 % 0.1-1 mL  0.1-1 mL Other Q1H PRN        lisinopril (ZESTRIL) tablet 5 mg  5 mg Oral Daily   5 mg at 07/31/23 0752    melatonin tablet 1 mg  1 mg Oral At Bedtime PRN        metFORMIN (GLUCOPHAGE XR) 24 hr tablet 1,000 mg  1,000 mg Oral BID w/meals   1,000 mg at 07/31/23 0752    naloxone (NARCAN) injection 0.2 mg  0.2 mg Intravenous Q2 Min PRN        Or    naloxone (NARCAN) injection 0.4 mg  0.4 mg Intravenous Q2 Min PRN        Or    naloxone (NARCAN) injection 0.2 mg  0.2 mg Intramuscular Q2 Min PRN        Or    naloxone (NARCAN) injection 0.4 mg  0.4 mg Intramuscular Q2 Min PRN        ondansetron (ZOFRAN ODT) ODT tab 4 mg  4 mg Oral Q6H PRN        Or    ondansetron (ZOFRAN) injection 4 mg  4 mg Intravenous Q6H PRN        oxyCODONE (ROXICODONE) tablet 5 mg  5 mg Oral Q4H PRN        Or    oxyCODONE (ROXICODONE) tablet 10 mg  10 mg Oral Q4H PRN   10 mg at 07/31/23 0752    pantoprazole (PROTONIX) EC tablet 40 mg  40 mg Oral BID   40 mg at 07/31/23 0752    pramipexole (MIRAPEX) tablet 1 mg  1 mg Oral BID   1 mg at 07/31/23 0752    prochlorperazine (COMPAZINE) injection 5 mg   5 mg Intravenous Q6H PRN        Or    prochlorperazine (COMPAZINE) tablet 5 mg  5 mg Oral Q6H PRN        senna-docusate (SENOKOT-S/PERICOLACE) 8.6-50 MG per tablet 1 tablet  1 tablet Oral BID   1 tablet at 07/30/23 1408    Or    senna-docusate (SENOKOT-S/PERICOLACE) 8.6-50 MG per tablet 2 tablet  2 tablet Oral BID        sertraline (ZOLOFT) tablet 50 mg  50 mg Oral Daily   50 mg at 07/31/23 0752    simvastatin (ZOCOR) tablet 40 mg  40 mg Oral At Bedtime   40 mg at 07/30/23 2210    sodium chloride (PF) 0.9% PF flush 3 mL  3 mL Intracatheter Q8H   3 mL at 07/31/23 0549    sodium chloride (PF) 0.9% PF flush 3 mL  3 mL Intracatheter q1 min prn        traZODone (DESYREL) tablet 50 mg  50 mg Oral At Bedtime   50 mg at 07/30/23 2210     Current Outpatient Medications Ordered in Epic   Medication    acetaminophen (TYLENOL) 325 MG tablet    hydrOXYzine (ATARAX) 10 MG tablet    ondansetron (ZOFRAN ODT) 4 MG ODT tab    oxyCODONE (ROXICODONE) 5 MG tablet    senna-docusate (SENOKOT-S/PERICOLACE) 8.6-50 MG tablet                  Physical Exam:   Vitals were reviewed  Patient Vitals for the past 8 hrs:   BP Temp Temp src Pulse Resp SpO2   07/31/23 0730 122/66 98.1  F (36.7  C) Oral 79 -- 97 %   07/31/23 0340 103/56 97.9  F (36.6  C) Oral 71 18 97 %     GEN: NAD, sitting up in bed  EYES: EOMI  MOUTH: MMM  NECK: Supple  RESP: Unlabored breathing  NEURO: AAO           Data:   No results found for: NTBNPI, NTBNP  Lab Results   Component Value Date    WBC 10.2 07/30/2023    WBC 8.9 04/03/2022    WBC 9.9 09/15/2021    HGB 10.4 (L) 07/30/2023    HGB 11.8 04/03/2022    HGB 11.0 (L) 09/15/2021    HCT 33.5 (L) 07/30/2023    HCT 37.9 04/03/2022    HCT 35.8 09/15/2021    MCV 78 07/30/2023    MCV 84 04/03/2022    MCV 86 09/15/2021     07/30/2023     04/03/2022     09/15/2021     Lab Results   Component Value Date    INR 1.09 04/10/2006    INR 1.07 03/28/2006      All cultures:  Recent Labs   Lab 07/30/23  0536   CULTURE  <10,000 CFU/mL Urogenital jose

## 2023-07-31 NOTE — PLAN OF CARE
PRIMARY DIAGNOSIS: s/p cysto with L stent placement   OUTPATIENT/OBSERVATION GOALS TO BE MET BEFORE DISCHARGE:  1. Stable vital signs Yes  2. Tolerating diet:Yes  3. Pain controlled with oral pain medications:  Yes  4. Positive bowel sounds:  Yes  5. Voiding without difficulty:  Yes  6. Able to ambulate:  Yes  7. Provider specific discharge goals met:  Yes     Discharge Planner Nurse   Safe discharge environment identified: Yes  Barriers to discharge: NO        Patient is A&Ox4, up SBA, tolerating oral pain meds, voiding adequately. Oxy given for lower abdominal pain.

## 2023-08-16 ENCOUNTER — TRANSFERRED RECORDS (OUTPATIENT)
Dept: MULTI SPECIALTY CLINIC | Facility: CLINIC | Age: 73
End: 2023-08-16

## 2023-08-16 LAB
ALT SERPL-CCNC: 10 IU/L (ref 10–35)
AST SERPL-CCNC: 19 IU/L (ref 10–35)
CREATININE (EXTERNAL): 0.82 MG/DL (ref 0.5–0.9)
GFR ESTIMATED (EXTERNAL): 76 ML/MIN/1.73M2
GLUCOSE (EXTERNAL): 131 MG/DL (ref 70–99)
POTASSIUM (EXTERNAL): 3.9 MMOL/L (ref 3.5–5.1)

## 2023-08-25 ENCOUNTER — HOSPITAL ENCOUNTER (OUTPATIENT)
Facility: CLINIC | Age: 73
Discharge: HOME | End: 2023-08-25
Attending: UROLOGY | Admitting: UROLOGY
Payer: COMMERCIAL

## 2023-08-25 ENCOUNTER — ANESTHESIA (OUTPATIENT)
Dept: SURGERY | Facility: CLINIC | Age: 73
End: 2023-08-25
Payer: COMMERCIAL

## 2023-08-25 ENCOUNTER — APPOINTMENT (OUTPATIENT)
Dept: GENERAL RADIOLOGY | Facility: CLINIC | Age: 73
End: 2023-08-25
Attending: UROLOGY
Payer: COMMERCIAL

## 2023-08-25 ENCOUNTER — ANESTHESIA EVENT (OUTPATIENT)
Dept: SURGERY | Facility: CLINIC | Age: 73
End: 2023-08-25
Payer: COMMERCIAL

## 2023-08-25 VITALS
RESPIRATION RATE: 16 BRPM | TEMPERATURE: 97.4 F | WEIGHT: 155 LBS | OXYGEN SATURATION: 95 % | HEIGHT: 61 IN | BODY MASS INDEX: 29.27 KG/M2 | DIASTOLIC BLOOD PRESSURE: 75 MMHG | SYSTOLIC BLOOD PRESSURE: 141 MMHG | HEART RATE: 75 BPM

## 2023-08-25 DIAGNOSIS — Z98.890 POSTOPERATIVE STATE: Primary | ICD-10-CM

## 2023-08-25 LAB
GLUCOSE BLDC GLUCOMTR-MCNC: 162 MG/DL (ref 70–99)
GLUCOSE BLDC GLUCOMTR-MCNC: 179 MG/DL (ref 70–99)

## 2023-08-25 PROCEDURE — C1894 INTRO/SHEATH, NON-LASER: HCPCS | Performed by: UROLOGY

## 2023-08-25 PROCEDURE — 999N000179 XR SURGERY CARM FLUORO LESS THAN 5 MIN W STILLS: Mod: TC

## 2023-08-25 PROCEDURE — 250N000011 HC RX IP 250 OP 636: Mod: JZ | Performed by: ANESTHESIOLOGY

## 2023-08-25 PROCEDURE — 250N000025 HC SEVOFLURANE, PER MIN: Performed by: UROLOGY

## 2023-08-25 PROCEDURE — C1769 GUIDE WIRE: HCPCS | Performed by: UROLOGY

## 2023-08-25 PROCEDURE — 258N000003 HC RX IP 258 OP 636: Performed by: NURSE ANESTHETIST, CERTIFIED REGISTERED

## 2023-08-25 PROCEDURE — 272N000001 HC OR GENERAL SUPPLY STERILE: Performed by: UROLOGY

## 2023-08-25 PROCEDURE — 250N000013 HC RX MED GY IP 250 OP 250 PS 637: Performed by: UROLOGY

## 2023-08-25 PROCEDURE — 250N000011 HC RX IP 250 OP 636: Mod: JZ | Performed by: NURSE ANESTHETIST, CERTIFIED REGISTERED

## 2023-08-25 PROCEDURE — 250N000011 HC RX IP 250 OP 636

## 2023-08-25 PROCEDURE — 999N000141 HC STATISTIC PRE-PROCEDURE NURSING ASSESSMENT: Performed by: UROLOGY

## 2023-08-25 PROCEDURE — 710N000009 HC RECOVERY PHASE 1, LEVEL 1, PER MIN: Performed by: UROLOGY

## 2023-08-25 PROCEDURE — 250N000009 HC RX 250: Performed by: NURSE ANESTHETIST, CERTIFIED REGISTERED

## 2023-08-25 PROCEDURE — 360N000076 HC SURGERY LEVEL 3, PER MIN: Performed by: UROLOGY

## 2023-08-25 PROCEDURE — 82962 GLUCOSE BLOOD TEST: CPT

## 2023-08-25 PROCEDURE — C1758 CATHETER, URETERAL: HCPCS | Performed by: UROLOGY

## 2023-08-25 PROCEDURE — 710N000012 HC RECOVERY PHASE 2, PER MINUTE: Performed by: UROLOGY

## 2023-08-25 PROCEDURE — C2617 STENT, NON-COR, TEM W/O DEL: HCPCS | Performed by: UROLOGY

## 2023-08-25 PROCEDURE — 370N000017 HC ANESTHESIA TECHNICAL FEE, PER MIN: Performed by: UROLOGY

## 2023-08-25 DEVICE — URETERAL STENT
Type: IMPLANTABLE DEVICE | Site: URETER | Status: FUNCTIONAL
Brand: POLARIS™ ULTRA

## 2023-08-25 RX ORDER — SODIUM CHLORIDE, SODIUM LACTATE, POTASSIUM CHLORIDE, CALCIUM CHLORIDE 600; 310; 30; 20 MG/100ML; MG/100ML; MG/100ML; MG/100ML
INJECTION, SOLUTION INTRAVENOUS CONTINUOUS
Status: DISCONTINUED | OUTPATIENT
Start: 2023-08-25 | End: 2023-08-25 | Stop reason: HOSPADM

## 2023-08-25 RX ORDER — LABETALOL HYDROCHLORIDE 5 MG/ML
10 INJECTION, SOLUTION INTRAVENOUS
Status: DISCONTINUED | OUTPATIENT
Start: 2023-08-25 | End: 2023-08-25 | Stop reason: HOSPADM

## 2023-08-25 RX ORDER — FENTANYL CITRATE 0.05 MG/ML
50 INJECTION, SOLUTION INTRAMUSCULAR; INTRAVENOUS EVERY 5 MIN PRN
Status: DISCONTINUED | OUTPATIENT
Start: 2023-08-25 | End: 2023-08-25 | Stop reason: HOSPADM

## 2023-08-25 RX ORDER — FENTANYL CITRATE 50 UG/ML
INJECTION, SOLUTION INTRAMUSCULAR; INTRAVENOUS PRN
Status: DISCONTINUED | OUTPATIENT
Start: 2023-08-25 | End: 2023-08-25

## 2023-08-25 RX ORDER — CEFAZOLIN SODIUM/WATER 2 G/20 ML
2 SYRINGE (ML) INTRAVENOUS SEE ADMIN INSTRUCTIONS
Status: DISCONTINUED | OUTPATIENT
Start: 2023-08-25 | End: 2023-08-25 | Stop reason: HOSPADM

## 2023-08-25 RX ORDER — FENTANYL CITRATE 0.05 MG/ML
25 INJECTION, SOLUTION INTRAMUSCULAR; INTRAVENOUS EVERY 5 MIN PRN
Status: DISCONTINUED | OUTPATIENT
Start: 2023-08-25 | End: 2023-08-25 | Stop reason: HOSPADM

## 2023-08-25 RX ORDER — ONDANSETRON 4 MG/1
4 TABLET, ORALLY DISINTEGRATING ORAL EVERY 30 MIN PRN
Status: DISCONTINUED | OUTPATIENT
Start: 2023-08-25 | End: 2023-08-25 | Stop reason: HOSPADM

## 2023-08-25 RX ORDER — ONDANSETRON 2 MG/ML
INJECTION INTRAMUSCULAR; INTRAVENOUS PRN
Status: DISCONTINUED | OUTPATIENT
Start: 2023-08-25 | End: 2023-08-25

## 2023-08-25 RX ORDER — CEFAZOLIN SODIUM/WATER 2 G/20 ML
2 SYRINGE (ML) INTRAVENOUS
Status: COMPLETED | OUTPATIENT
Start: 2023-08-25 | End: 2023-08-25

## 2023-08-25 RX ORDER — HYDROMORPHONE HCL IN WATER/PF 6 MG/30 ML
0.2 PATIENT CONTROLLED ANALGESIA SYRINGE INTRAVENOUS EVERY 5 MIN PRN
Status: DISCONTINUED | OUTPATIENT
Start: 2023-08-25 | End: 2023-08-25 | Stop reason: HOSPADM

## 2023-08-25 RX ORDER — ACETAMINOPHEN 325 MG/1
650 TABLET ORAL ONCE
Status: COMPLETED | OUTPATIENT
Start: 2023-08-25 | End: 2023-08-25

## 2023-08-25 RX ORDER — KETOROLAC TROMETHAMINE 15 MG/ML
15 INJECTION, SOLUTION INTRAMUSCULAR; INTRAVENOUS ONCE
Status: COMPLETED | OUTPATIENT
Start: 2023-08-25 | End: 2023-08-25

## 2023-08-25 RX ORDER — ONDANSETRON 2 MG/ML
4 INJECTION INTRAMUSCULAR; INTRAVENOUS EVERY 30 MIN PRN
Status: DISCONTINUED | OUTPATIENT
Start: 2023-08-25 | End: 2023-08-25 | Stop reason: HOSPADM

## 2023-08-25 RX ORDER — LIDOCAINE HYDROCHLORIDE 20 MG/ML
INJECTION, SOLUTION INFILTRATION; PERINEURAL PRN
Status: DISCONTINUED | OUTPATIENT
Start: 2023-08-25 | End: 2023-08-25

## 2023-08-25 RX ORDER — OXYCODONE HYDROCHLORIDE 5 MG/1
5 TABLET ORAL ONCE
Status: COMPLETED | OUTPATIENT
Start: 2023-08-25 | End: 2023-08-25

## 2023-08-25 RX ORDER — HYDROMORPHONE HCL IN WATER/PF 6 MG/30 ML
0.4 PATIENT CONTROLLED ANALGESIA SYRINGE INTRAVENOUS EVERY 5 MIN PRN
Status: DISCONTINUED | OUTPATIENT
Start: 2023-08-25 | End: 2023-08-25 | Stop reason: HOSPADM

## 2023-08-25 RX ORDER — PROPOFOL 10 MG/ML
INJECTION, EMULSION INTRAVENOUS PRN
Status: DISCONTINUED | OUTPATIENT
Start: 2023-08-25 | End: 2023-08-25

## 2023-08-25 RX ORDER — PHENAZOPYRIDINE HYDROCHLORIDE 200 MG/1
200 TABLET, FILM COATED ORAL 3 TIMES DAILY PRN
Qty: 6 TABLET | Refills: 0 | Status: SHIPPED | OUTPATIENT
Start: 2023-08-25

## 2023-08-25 RX ORDER — OXYCODONE HYDROCHLORIDE 5 MG/1
5-10 TABLET ORAL EVERY 6 HOURS PRN
Qty: 10 TABLET | Refills: 0 | Status: SHIPPED | OUTPATIENT
Start: 2023-08-25

## 2023-08-25 RX ORDER — OXYCODONE HYDROCHLORIDE 5 MG/1
5 TABLET ORAL EVERY 6 HOURS PRN
COMMUNITY

## 2023-08-25 RX ORDER — DEXAMETHASONE SODIUM PHOSPHATE 4 MG/ML
INJECTION, SOLUTION INTRA-ARTICULAR; INTRALESIONAL; INTRAMUSCULAR; INTRAVENOUS; SOFT TISSUE PRN
Status: DISCONTINUED | OUTPATIENT
Start: 2023-08-25 | End: 2023-08-25

## 2023-08-25 RX ORDER — SODIUM CHLORIDE, SODIUM LACTATE, POTASSIUM CHLORIDE, CALCIUM CHLORIDE 600; 310; 30; 20 MG/100ML; MG/100ML; MG/100ML; MG/100ML
INJECTION, SOLUTION INTRAVENOUS CONTINUOUS PRN
Status: DISCONTINUED | OUTPATIENT
Start: 2023-08-25 | End: 2023-08-25

## 2023-08-25 RX ADMIN — FENTANYL CITRATE 50 MCG: 50 INJECTION, SOLUTION INTRAMUSCULAR; INTRAVENOUS at 12:15

## 2023-08-25 RX ADMIN — PHENYLEPHRINE HYDROCHLORIDE 150 MCG: 10 INJECTION INTRAVENOUS at 12:12

## 2023-08-25 RX ADMIN — ONDANSETRON 4 MG: 2 INJECTION INTRAMUSCULAR; INTRAVENOUS at 12:12

## 2023-08-25 RX ADMIN — PHENYLEPHRINE HYDROCHLORIDE 150 MCG: 10 INJECTION INTRAVENOUS at 12:21

## 2023-08-25 RX ADMIN — OXYCODONE HYDROCHLORIDE 5 MG: 5 TABLET ORAL at 14:16

## 2023-08-25 RX ADMIN — PROPOFOL 80 MG: 10 INJECTION, EMULSION INTRAVENOUS at 12:59

## 2023-08-25 RX ADMIN — FENTANYL CITRATE 25 MCG: 50 INJECTION, SOLUTION INTRAMUSCULAR; INTRAVENOUS at 12:33

## 2023-08-25 RX ADMIN — KETOROLAC TROMETHAMINE 15 MG: 15 INJECTION, SOLUTION INTRAMUSCULAR; INTRAVENOUS at 14:57

## 2023-08-25 RX ADMIN — FENTANYL CITRATE 50 MCG: 50 INJECTION, SOLUTION INTRAMUSCULAR; INTRAVENOUS at 14:00

## 2023-08-25 RX ADMIN — FENTANYL CITRATE 50 MCG: 50 INJECTION, SOLUTION INTRAMUSCULAR; INTRAVENOUS at 13:36

## 2023-08-25 RX ADMIN — ONDANSETRON 4 MG: 2 INJECTION INTRAMUSCULAR; INTRAVENOUS at 13:36

## 2023-08-25 RX ADMIN — SODIUM CHLORIDE, POTASSIUM CHLORIDE, SODIUM LACTATE AND CALCIUM CHLORIDE: 600; 310; 30; 20 INJECTION, SOLUTION INTRAVENOUS at 12:02

## 2023-08-25 RX ADMIN — LIDOCAINE HYDROCHLORIDE 80 MG: 20 INJECTION, SOLUTION INFILTRATION; PERINEURAL at 12:07

## 2023-08-25 RX ADMIN — FENTANYL CITRATE 25 MCG: 50 INJECTION, SOLUTION INTRAMUSCULAR; INTRAVENOUS at 12:07

## 2023-08-25 RX ADMIN — Medication 2 G: at 12:02

## 2023-08-25 RX ADMIN — DEXAMETHASONE SODIUM PHOSPHATE 4 MG: 4 INJECTION, SOLUTION INTRA-ARTICULAR; INTRALESIONAL; INTRAMUSCULAR; INTRAVENOUS; SOFT TISSUE at 12:12

## 2023-08-25 RX ADMIN — PROPOFOL 120 MG: 10 INJECTION, EMULSION INTRAVENOUS at 12:07

## 2023-08-25 RX ADMIN — ACETAMINOPHEN 650 MG: 325 TABLET, FILM COATED ORAL at 15:01

## 2023-08-25 ASSESSMENT — ACTIVITIES OF DAILY LIVING (ADL)
ADLS_ACUITY_SCORE: 39

## 2023-08-25 NOTE — INTERVAL H&P NOTE
"I have reviewed the surgical (or preoperative) H&P that is linked to this encounter, and examined the patient. There are no significant changes    Clinical Conditions Present on Arrival:  Clinically Significant Risk Factors Present on Admission                 # Drug Induced Platelet Defect: home medication list includes an antiplatelet medication  # DMII: A1C = 8.9 % (Ref range: <5.7 %) within past 6 months  # Overweight: Estimated body mass index is 29.29 kg/m  as calculated from the following:    Height as of this encounter: 1.549 m (5' 1\").    Weight as of this encounter: 70.3 kg (155 lb).       "

## 2023-08-25 NOTE — OP NOTE
Procedure Date: 08/25/2023    PREOPERATIVE DIAGNOSIS:  Left kidney stones.    POSTOPERATIVE DIAGNOSIS:  Left kidney stones.    PROCEDURE:  Cystoscopy with left ureteroscopy, laser lithotripsy and left ureteral stent exchange.     SURGEON:  Rah Beard MD.    ANESTHESIA:  General.    ESTIMATED BLOOD LOSS:  Less than 5 mL.    SPECIMEN:  None.    FINDINGS:  Four large stones (7-10 mm) in left kidney and encrusted stent in bladder.    INDICATIONS:  The patient is a 73-year-old female with history of multiple sclerosis, hypertension, diabetes, hyperlipidemia and kidney stones (calcium oxalate stone).  She also has a history of renal cell carcinoma.  She had a right laparoscopic cryoablation in 11/2013 by Dr. Corona.  She had bilateral ureteroscopies for stones in the past.  She developed severe left flank pain and a CT scan performed on 07/20/2023 showed no stones in the right side.  Left side, there was a 1 cm stone in the upper pole and a 7 mm stone in the lower pole and a 7 mm stone at the ureteropelvic junction.  Dr. Arriaza placed a stent on 07/30/2023.  She presents now to undergo treatment for her left kidney stones with left ureteroscopy with laser lithotripsy and stent removal or exchange.  The risks and benefits were discussed with the patient prior to surgery.    DESCRIPTION OF PROCEDURE:  The patient was brought to the operating room and placed in a supine position.  After undergoing general anesthetic, she was placed in a low lithotomy position.  Her genitalia was prepped and draped in usual sterile fashion.  A 22 rigid cystoscope was inserted in the bladder.  This showed a normal trigone.  A stent encrusted with stone was seen in the left ureteral orifice.  The right ureteral orifice was normal in location and appearance.  There were no tumors in the bladder.  A 2-prong grasper was used to grab the stent and crush the stone off it.  It was then used to pull the stent out through the urethra.  There was no  resistance and the stent was able to be removed entirely.  Just the distal curl was encrusted.  The rest of the stent was not.    The cystoscope was inserted in the bladder.  A 0.035 sensor wire was passed up the left ureter into the kidney under fluoroscopic guidance.  The cystoscope was removed.  A dual-lumen catheter was advanced over the sensor wire into the proximal ureter under fluoroscopic guidance.  A curved sensor wire was then passed through the dual lumen catheter up into the kidney under fluoroscopic guidance.  The dual lumen catheter was removed.  An 11/13 Taiwanese ureteral sheath was advanced over the sensor wire into the mid ureter under fluoroscopic guidance.  That sensor wire was removed.  Digital flexible ureteroscope was advanced through the sheath up into the kidney.  There were no stones in the ureter.  The kidney had significant debris and this was irrigated out.  Visibility was somewhat limited due to a narrow ureter resulting in poor irrigation/flow.  There were 2 stones located in the lower pole rde.  These appeared to be 6 and 7 mm.  A 200 micron laser was then passed through the ureteroscope.  Holmium laser was used to fragment the stone, setting of 0.4 joules and a frequency of 50 Hz.  The stone was fragmented/dusted into small pieces.  Next, another stone was located in the mid pole red.  This was roughly again 7 mm.  This was also fragmented/dusted with the holmium laser.  A final stone was then seen in the upper pole red and this was also fragmented/dusted using the holmium laser.  The kidney was then reexplored.  Visibility again was somewhat limited due to the amount of stone debris present causing a snow-globe effect.  I did my best to explore the kidney.  I could not find any large stones.  There could be some pieces present that are hidden by the debris.  The left ureter was visualized.  There were no stones in the ureter.  Contrast injected into the kidney and the  ureteroscope was removed.    The cystoscope was again inserted in the bladder.  A 6-Macedonian x 24 cm stent was then placed in the left ureter.  This then could be seen curled in the kidney and the bladder.  The patient's bladder was emptied, cystoscope was removed.    PLAN:  I plan to see her back in 1 week to remove the stent in the office.    Rah Beard MD        D: 2023   T: 2023   MT: [email or web address]    Name:     JENY WILLAMS  MRN:      [id number]        Account:        [id number]   :      1950           Procedure Date: 2023     Document: [id number]    cc:  Marysol Tamayo MD   [facility]

## 2023-08-25 NOTE — OR NURSING
Up to BR voids pink/red X2- AOX3-VSS-O2 sats >92% RA- Good PO intake, pain tolerable 4/10- Pt and responsible adult verbalize understanding of discharge instructions, denies questions. Up in W/C - transported to door for discharge to home.

## 2023-08-25 NOTE — ANESTHESIA PREPROCEDURE EVALUATION
Anesthesia Pre-Procedure Evaluation    Patient: Charlette Sharp   MRN: [id number] : 1950        Procedure : Procedure(s):  CYSTOSCOPY, LEFT URETEROSCOPY, HOLMIUM LASER LITHOTRIPSY, AND LEFT STENT REMOVAL VERSUS EXCHANGE          Past Medical History:   Diagnosis Date    Anemia     Iron Deficiency    B12 deficiency     Chronic diarrhea     Depressive disorder     Diabetes mellitus (H)     Type 2    Diverticulitis     Gastro-oesophageal reflux disease     Hypercholesteraemia     Hyperlipidemia     Hypertension     Insomnia     Kidney stone     MS (multiple sclerosis) (H)     generalized weakness    Pancreatic pseudocyst     Pancreatitis     Restless legs     Vitamin D deficiency       Past Surgical History:   Procedure Laterality Date    APPENDECTOMY      CHOLECYSTECTOMY      COLONOSCOPY      CYSTOSCOPY, RETROGRADES, INSERT STENT URETER(S), COMBINED Left 2023    Procedure: 1. Cystourethroscopy with left retrograde pyelography 2. Placement of left ureteral stent. 3. Intraoperative interpretation of fluoroscopic imaging.;  Surgeon: Young Arriaza MD;  Location:  OR    DILATION AND CURETTAGE, OPERATIVE HYSTEROSCOPY, COMBINED N/A 3/2/2023    Procedure: HYSTEROSCOPY, WITH DILATION AND CURETTAGE;  Surgeon: Deepika Rodney MD;  Location:  OR    LAPAROSCOPIC CRYOABLATION TUMOR KIDNEY  2013    Procedure: LAPAROSCOPIC CRYOABLATION TUMOR KIDNEY;;  Surgeon: Elijah Corona MD;  Location:  OR    LASER HOLMIUM LITHOTRIPSY URETER(S), INSERT STENT, COMBINED Left 2020    Procedure: CYSTOSCOPY, LEFT RETROGRADE, LEFT URETEROSCOPY, WITH LITHOTRIPSY USING LASER, STONE BASKETING, LEFT URETERAL STENT INSERTION;  Surgeon: Juan Luis Nagy MD;  Location:  OR    NEPHRECTOMY PARTIAL  2013    Procedure: NEPHRECTOMY PARTIAL;  LAPAROSCOPIC RIGHT RENAL CRYOABLATION;  Surgeon: Elijah Corona MD;  Location: [facility] OR      No Known Allergies   Social History     Tobacco Use     Smoking status: Never    Smokeless tobacco: Never   Substance Use Topics    Alcohol use: Yes     Comment: 2 drinks, once weekly- socially      Wt Readings from Last 1 Encounters:   08/25/23 70.3 kg (155 lb)        Anesthesia Evaluation            ROS/MED HX  ENT/Pulmonary:    (-) sleep apnea   Neurologic: Comment: RLS;    (+)                   Multiple Sclerosis,             Cardiovascular:     (+) Dyslipidemia hypertension- -   -  - -                                      METS/Exercise Tolerance:     Hematologic:       Musculoskeletal:       GI/Hepatic: Comment: Pancreatic pseudocyst;    (+) GERD, Asymptomatic on medication,                  Renal/Genitourinary:     (+)       Nephrolithiasis ,       Endo:     (+)  type II DM,   Using insulin,                 Psychiatric/Substance Use:     (+) psychiatric history depression       Infectious Disease:       Malignancy:       Other:            Physical Exam    Airway        Mallampati: II   TM distance: > 3 FB   Neck ROM: full   Mouth opening: > 3 cm    Respiratory Devices and Support         Dental       (+) Minor Abnormalities - some fillings, tiny chips      Cardiovascular   cardiovascular exam normal          Pulmonary   pulmonary exam normal                OUTSIDE LABS:  CBC:   Lab Results   Component Value Date    WBC 10.2 07/30/2023    WBC 8.9 04/03/2022    HGB 10.4 (L) 07/30/2023    HGB 11.8 04/03/2022    HCT 33.5 (L) 07/30/2023    HCT 37.9 04/03/2022     07/30/2023     04/03/2022     BMP:   Lab Results   Component Value Date     07/30/2023     04/03/2022    POTASSIUM 3.8 07/30/2023    POTASSIUM 4.0 04/03/2022    CHLORIDE 101 07/30/2023    CHLORIDE 104 04/03/2022    CO2 25 07/30/2023    CO2 26 04/03/2022    BUN 15.1 07/30/2023    BUN 16 04/03/2022    CR 0.79 07/30/2023    CR 0.78 04/03/2022     (H) 08/25/2023     (H) 07/31/2023     COAGS:   Lab Results   Component Value Date    PTT 33 04/10/2006    INR 1.09 04/10/2006      POC:   Lab Results   Component Value Date     (H) 08/28/2020     HEPATIC:   Lab Results   Component Value Date    ALBUMIN 3.9 07/30/2023    PROTTOTAL 6.6 07/30/2023    ALT 11 07/30/2023    AST 16 07/30/2023    ALKPHOS 85 07/30/2023    BILITOTAL 0.2 07/30/2023     OTHER:   Lab Results   Component Value Date    LACT 1.0 09/15/2021    A1C 8.9 (H) 07/30/2023    TATYANA 9.0 07/30/2023    MAG 1.8 09/13/2013    LIPASE 81 04/03/2022    AMYLASE 275 (H) 04/10/2006    TSH 1.55 03/28/2006       Anesthesia Plan    ASA Status:  3    NPO Status:  NPO Appropriate    Anesthesia Type: General.     - Airway: LMA   Induction: Intravenous.   Maintenance: Balanced.        Consents    Anesthesia Plan(s) and associated risks, benefits, and realistic alternatives discussed. Questions answered and patient/representative(s) expressed understanding.     - Discussed:     - Discussed with:  Patient            Postoperative Care    Pain management: IV analgesics.   PONV prophylaxis: Ondansetron (or other 5HT-3)     Comments:                OLIVIER SEXTON MD

## 2023-08-25 NOTE — DISCHARGE INSTRUCTIONS
Same Day Surgery Discharge Instructions for  Sedation and General Anesthesia     It's not unusual to feel dizzy, light-headed or faint for up to 24 hours after surgery or while taking pain medication.  If you have these symptoms: sit for a few minutes before standing and have someone assist you when you get up to walk or use the bathroom.    You should rest and relax for the next 24 hours. We recommend you make arrangements to have an adult stay with you for at least 24 hours after your discharge.  Avoid hazardous and strenuous activity.    DO NOT DRIVE any vehicle or operate mechanical equipment for 24 hours following the end of your surgery.  Even though you may feel normal, your reactions may be affected by the medication you have received.    Do not drink alcoholic beverages for 24 hours following surgery.     Slowly progress to your regular diet as you feel able. It's not unusual to feel nauseated and/or vomit after receiving anesthesia.  If you develop these symptoms, drink clear liquids (apple juice, ginger ale, broth, 7-up, etc. ) until you feel better.  If your nausea and vomiting persists for 24 hours, please notify your surgeon.      All narcotic pain medications, along with inactivity and anesthesia, can cause constipation. Drinking plenty of liquids and increasing fiber intake will help.    For any questions of a medical nature, call your surgeon.    Do not make important decisions for 24 hours.    If you had general anesthesia, you may have a sore throat for a couple of days related to the breathing tube used during surgery.  You may use Cepacol lozenges to help with this discomfort.  If it worsens or if you develop a fever, contact your surgeon.     If you feel your pain is not well managed with the pain medications prescribed by your surgeon, please contact your surgeon's office to let them know so they can address your concerns.     Today you received Toradol, an antiinflammatory medication similar  to Ibuprofen.  You should not take other antiinflammatory medication, such as Ibuprofen, Motrin, Advil, Aleve, Naprosyn, etc until 9:00 PM. Hand written on original    Today you were given 650 mg of Tylenol at 3:00 PM. The recommended daily maximum dose is 4000 mg. Hand written on original      Signs of possible infection: Check your incision daily for redness, swelling, warmth, red streaks or foul drainage.   Elevated temperature.  Pain not controlled with pain medication and/or rest.   Uncontrolled nausea or vomiting.  Any questions or concerns.     **If you have questions or concerns about your procedure,   call Dr. Beard at [phone]**

## 2023-08-25 NOTE — ANESTHESIA PROCEDURE NOTES
Airway       Patient location during procedure: OR  Staff -        Anesthesiologist:  Nino Lyon MD       CRNA: Saskia Flores APRN CRNA       Performed By: CRNA  Consent for Airway        Urgency: elective  Indications and Patient Condition       Indications for airway management: elida-procedural       Induction type:intravenous       Mask difficulty assessment: 1 - vent by mask    Final Airway Details       Final airway type: supraglottic airway    Supraglottic Airway Details        Type: LMA       Brand: Ambu AuraGain       LMA size: 4    Post intubation assessment        Placement verified by: capnometry, equal breath sounds and chest rise        Number of attempts at approach: 1       Number of other approaches attempted: 0       Secured with: plastic tape       Ease of procedure: easy       Dentition: Intact and Unchanged

## 2023-08-25 NOTE — ANESTHESIA CARE TRANSFER NOTE
Patient: Charlette Sharp    Procedure: Procedure(s):  CYSTOSCOPY, LEFT URETEROSCOPY, HOLMIUM LASER LITHOTRIPSY, AND LEFT STENT EXCHANGE       Diagnosis: Kidney stone [N20.0]  Diagnosis Additional Information: No value filed.    Anesthesia Type:   General     Note:    Oropharynx: oropharynx clear of all foreign objects and spontaneously breathing  Level of Consciousness: awake  Oxygen Supplementation: face mask  Level of Supplemental Oxygen (L/min / FiO2): 6  Independent Airway: airway patency satisfactory and stable  Dentition: dentition unchanged  Vital Signs Stable: post-procedure vital signs reviewed and stable  Report to RN Given: handoff report given  Patient transferred to: PACU    Handoff Report: Identifed the Patient, Identified the Reponsible Provider, Reviewed the pertinent medical history, Discussed the surgical course, Reviewed Intra-OP anesthesia mangement and issues during anesthesia, Set expectations for post-procedure period and Allowed opportunity for questions and acknowledgement of understanding      Vitals:  Vitals Value Taken Time   /91 08/25/23 1327   Temp     Pulse 74 08/25/23 1329   Resp 20 08/25/23 1329   SpO2 100 % 08/25/23 1329   Vitals shown include unvalidated device data.    Electronically Signed By: AR Clark CRNA  August 25, 2023  1:30 PM

## 2023-08-25 NOTE — ANESTHESIA POSTPROCEDURE EVALUATION
Patient: Charlette Sharp    Procedure: Procedure(s):  CYSTOSCOPY, LEFT URETEROSCOPY, HOLMIUM LASER LITHOTRIPSY, AND LEFT STENT EXCHANGE       Anesthesia Type:  General    Note:  Disposition: Outpatient   Postop Pain Control: Uneventful            Sign Out: Well controlled pain   PONV: No   Neuro/Psych: Uneventful            Sign Out: Acceptable/Baseline neuro status   Airway/Respiratory: Uneventful            Sign Out: Acceptable/Baseline resp. status   CV/Hemodynamics: Uneventful            Sign Out: Acceptable CV status; No obvious hypovolemia; No obvious fluid overload   Other NRE: NONE   DID A NON-ROUTINE EVENT OCCUR? No           Last vitals:  Vitals Value Taken Time   /84 08/25/23 1415   Temp 36.6  C (97.9  F) 08/25/23 1416   Pulse 71 08/25/23 1430   Resp 16 08/25/23 1430   SpO2 98 % 08/25/23 1430   Vitals shown include unvalidated device data.    Electronically Signed By: OLIVIER SEXTON MD  August 25, 2023  2:35 PM

## 2023-08-25 NOTE — BRIEF OP NOTE
[facility] Brief Operative Note    Pre-operative diagnosis: Kidney stone [N20.0]   Post-operative diagnosis: Same   Procedure: Procedure(s):  CYSTOSCOPY, LEFT URETEROSCOPY, HOLMIUM LASER LITHOTRIPSY, AND LEFT STENT EXCHANGE   Surgeon: FARHAT MAYEN MD   Assistant(s): None   Anesthesia: LMA   Estimated blood loss: Less than 10 ml   Total IV fluids: (See anesthesia record)   Blood transfusion: No transfusion was given during surgery   Total urine output: Not measured   Drains: None   Specimens: None   Implants: 6 Fr x 24 cm stent in Left urine   Findings: Encrusted stent in bladder - 4 large (7-10 mm) stones in kidney   Complications: None   Condition: Stable   Comments: See dictated operative report for full details

## 2024-01-16 ENCOUNTER — TRANSFERRED RECORDS (OUTPATIENT)
Dept: HEALTH INFORMATION MANAGEMENT | Facility: CLINIC | Age: 74
End: 2024-01-16
Payer: COMMERCIAL

## 2024-01-16 LAB
ALBUMIN (URINE) MG/SPEC: 28.3 MG/L
ALBUMIN/CREATININE RATIO: 15.3 MG/G CREAT
ALT SERPL-CCNC: 16 IU/L (ref 10–35)
AST SERPL-CCNC: 16 IU/L (ref 10–35)
CREATININE (EXTERNAL): 0.92 MG/DL (ref 0.5–0.9)
CREATININE (URINE): 1.85 G/L
GFR ESTIMATED (EXTERNAL): 66 ML/MIN/1.73M2
GLUCOSE (EXTERNAL): 167 MG/DL (ref 70–99)
HBA1C MFR BLD: 8 %
POTASSIUM (EXTERNAL): 4.7 MMOL/L (ref 3.5–5.1)

## 2024-06-01 ENCOUNTER — HEALTH MAINTENANCE LETTER (OUTPATIENT)
Age: 74
End: 2024-06-01

## 2024-08-26 NOTE — DISCHARGE INSTRUCTIONS
Same Day Surgery Discharge Instructions for  Sedation and General Anesthesia       It's not unusual to feel dizzy, light-headed or faint for up to 24 hours after surgery or while taking pain medication.  If you have these symptoms: sit for a few minutes before standing and have someone assist you when you get up to walk or use the bathroom.      You should rest and relax for the next 24 hours. We recommend you make arrangements to have an adult stay with you for at least 24 hours after your discharge.  Avoid hazardous and strenuous activity.      DO NOT DRIVE any vehicle or operate mechanical equipment for 24 hours following the end of your surgery.  Even though you may feel normal, your reactions may be affected by the medication you have received.      Do not drink alcoholic beverages for 24 hours following surgery.       Slowly progress to your regular diet as you feel able. It's not unusual to feel nauseated and/or vomit after receiving anesthesia.  If you develop these symptoms, drink clear liquids (apple juice, ginger ale, broth, 7-up, etc. ) until you feel better.  If your nausea and vomiting persists for 24 hours, please notify your surgeon.        All narcotic pain medications, along with inactivity and anesthesia, can cause constipation. Drinking plenty of liquids and increasing fiber intake will help.      For any questions of a medical nature, call your surgeon.      Do not make important decisions for 24 hours.      If you had general anesthesia, you may have a sore throat for a couple of days related to the breathing tube used during surgery.  You may use Cepacol lozenges to help with this discomfort.  If it worsens or if you develop a fever, contact your surgeon.       If you feel your pain is not well managed with the pain medications prescribed by your surgeon, please contact your surgeon's office to let them know so they can address your concerns.       CoVid 19 Information    We want to give you  information regarding Covid. Please consult your primary care provider with any questions you might have.     Patient who have symptoms (cough, fever, or shortness of breath), need to isolate for 7 days from when symptoms started OR 72 hours after fever resolves (without fever reducing medications) AND improvement of respiratory symptoms (whichever is longer).      Isolate yourself at home (in own room/own bathroom if possible)    Do Not allow any visitors    Do Not go to work or school    Do Not go to [employer],  centers, shopping, or other public places.    Do Not shake hands.    Avoid close and intimate contact with others (hugging, kissing).    Follow CDC recommendations for household cleaning of frequently touched services.     After the initial 7 days, continue to isolate yourself from household members as much as possible. To continue decrease the risk of community spread and exposure, you and any members of your household should limit activities in public for 14 days after starting home isolation.     You can reference the following CDC link for helpful home isolation/care tips:  https://www.cdc.gov/coronavirus/2019-ncov/downloads/10Things.pdf    Protect Others:    Cover Your Mouth and Nose with a mask, disposable tissue or wash cloth to avoid spreading germs to others.    Wash your hands and face frequently with soap and water    Call Your Primary Doctor If: Breathing difficulty develops or you become worse.    For more information about COVID19 and options for caring for yourself at home, please visit the CDC website at https://www.cdc.gov/coronavirus/2019-ncov/about/steps-when-sick.html  For more options for care at [facility], please visit our website at [email or web address]      Cystoscopy and Stent Placement Discharge Instructions    During surgery, a stent was placed in the ureter.  The ureter is the tube that drains urine from the kidney to the bladder.  The  stent is placed to dilate (open) the ureter so the stone fragments can pass easily through the ureter or to decrease ureteral swelling after surgery, or to relieve an obstruction. The stent is made of rubber. The upper end of the stent curls in the kidney while the lower end rests in the bladder      Diet:    Return to the diet that you were on before the procedure, unless you are given specific diet instructions.    It is important to drink 6-8 glasses of fluids per day at home - at least 3-4 glasses should be water.    Activity:    Walk short distances and increase as your strength allows.    You may climb stairs.    Do not do strenuous exercise or heavy lifting until approved by surgeon.    Do not drive while taking narcotic pain medications.    Bathing:    You may take a shower.    While the stent is in place you may experience the following symptoms:    Blood and/or small blood clots in urine.    Bladder spasm (frequency and urgency of urination).    Discomfort or aching in the back or side where the stent is.    Burning or discomfort at the end of urine stream.    To decrease these symptoms you should:    Take pain medication as prescribed.    Drink plenty of fluids.    If you experience pain at the end of urination try not emptying your bladder completely.    If having discomfort in back or side, decrease activity.    Call your physician if these signs/symptoms are present:    Pain that is not relieved by a short rest or ordered pain medications.    Temperature at or above 101.0 F or chills.    Inability or difficulty urinating.     Excessive blood in urine.    Any questions or concerns.            **If you have questions or concerns about your procedure,  call Dr. Nagy at [phone]**   [Patient Intake Form Reviewed] : Patient intake form was reviewed

## 2025-04-01 NOTE — ED TRIAGE NOTES
L lateral side/flank pain to LLQ abdominal pain with nausea and intermittent vomiting that began 2 weeks ago.  Diarrhea x 3 today without blood. Denies recent antibiotic use or urinary sx. Movement increases pain. Pt arrives with home w/c, not baseline. ABC in tact. A/Ox4   Left voicemail per HIPAA and informed Petra Felix to return call with any questions or concerns.

## 2025-04-12 ENCOUNTER — HEALTH MAINTENANCE LETTER (OUTPATIENT)
Age: 75
End: 2025-04-12

## (undated) DEVICE — PACK CYSTOSCOPY SBA15CYFSI

## (undated) DEVICE — LINEN TOWEL PACK X5 5464

## (undated) DEVICE — PAD CHUX UNDERPAD 23X24" 7136

## (undated) DEVICE — GUIDEWIRE SENSOR DUAL FLEX STR 0.035"X150CM M0066703080

## (undated) DEVICE — WIRE GUIDE AMPLATZ SUPER STIFF 0.035"X145CM 46-524

## (undated) DEVICE — SOL NACL 0.9% IRRIG 1000ML BOTTLE 2F7124

## (undated) DEVICE — GLOVE BIOGEL PI ULTRATOUCH SZ 7.5 41175

## (undated) DEVICE — CATH INTERMITTENT CLEAN-CATH FEMALE 14FR 6" VINYL LF 420614

## (undated) DEVICE — CATH URETERAL DUAL LUMEN 10FRX54CM M0064051000

## (undated) DEVICE — SOL NACL 0.9% IRRIG 1000ML BOTTLE 07138-09

## (undated) DEVICE — SOL NACL 0.9% INJ 1000ML BAG 2B1324X

## (undated) DEVICE — SHEATH URETERAL ACCESS NAVIGATOR 11/13FRX36CM 250-203

## (undated) DEVICE — CATH URETERAL FLEX TIP TIGERTAIL 06FRX70CM 139006

## (undated) DEVICE — BAG CYSTO TABLE DRAIN

## (undated) DEVICE — TUBING IRRIG TUR Y TYPE 96" LF 6543-01

## (undated) DEVICE — PACK CYSTO CUSTOM RIDGES

## (undated) DEVICE — LASER FIBER HOLMIUM FLEXIVA 200UM M0068403910 840-391

## (undated) DEVICE — SOL WATER IRRIG 1000ML BOTTLE 2F7114

## (undated) DEVICE — GLOVE PROTEXIS W/NEU-THERA 7.5  2D73TE75

## (undated) DEVICE — BASKET NITINOL TIPLESS HALO  1.5FRX120CM 554120

## (undated) DEVICE — LINEN HALF SHEET 5512

## (undated) DEVICE — CATH URETERAL OPEN END 6FR AXXCESS

## (undated) DEVICE — LASER FIBER HOLMIUM MOSES 200 D/F/L AC-10030100

## (undated) DEVICE — BAG CLEAR TRASH 1.3M 39X33" P4040C

## (undated) DEVICE — PACK TVT HYSTEROSCOPY SMA15HYFSE

## (undated) DEVICE — SOL NACL 0.9% IRRIG 3000ML BAG 07972-08

## (undated) DEVICE — RAD RX ISOVUE 300 (50ML) 61% IOPAMIDOL CHARGE PER ML

## (undated) DEVICE — GLOVE BIOGEL PI MICRO INDICATOR UNDERGLOVE SZ 7.0 48970

## (undated) DEVICE — PREP SCRUB SOL EXIDINE 4% CHG 4OZ 29002-404

## (undated) DEVICE — GLOVE BIOGEL PI ULTRATOUCH SZ 7.0 41170

## (undated) DEVICE — SHEATH URETERAL ACCESS NAVIGATOR HD 11/13FRX36CM M0062502220

## (undated) DEVICE — SOL NACL 0.9% IRRIG 3000ML BAG 2B7477

## (undated) DEVICE — LINEN FULL SHEET 5511

## (undated) DEVICE — GUIDEWIRE SENSOR DUAL FLEX ANG 0.035"X150CM M0066703010

## (undated) DEVICE — GLOVE BIOGEL PI ULTRATOUCH G SZ 6.5 42165

## (undated) DEVICE — Device

## (undated) RX ORDER — KETOROLAC TROMETHAMINE 15 MG/ML
INJECTION, SOLUTION INTRAMUSCULAR; INTRAVENOUS
Status: DISPENSED
Start: 2023-08-25

## (undated) RX ORDER — OXYCODONE HYDROCHLORIDE 5 MG/1
TABLET ORAL
Status: DISPENSED
Start: 2023-08-25

## (undated) RX ORDER — PROPOFOL 10 MG/ML
INJECTION, EMULSION INTRAVENOUS
Status: DISPENSED
Start: 2020-08-28

## (undated) RX ORDER — FENTANYL CITRATE 50 UG/ML
INJECTION, SOLUTION INTRAMUSCULAR; INTRAVENOUS
Status: DISPENSED
Start: 2023-08-25

## (undated) RX ORDER — ONDANSETRON 2 MG/ML
INJECTION INTRAMUSCULAR; INTRAVENOUS
Status: DISPENSED
Start: 2023-08-25

## (undated) RX ORDER — KETOROLAC TROMETHAMINE 15 MG/ML
INJECTION, SOLUTION INTRAMUSCULAR; INTRAVENOUS
Status: DISPENSED
Start: 2023-03-02

## (undated) RX ORDER — FENTANYL CITRATE 0.05 MG/ML
INJECTION, SOLUTION INTRAMUSCULAR; INTRAVENOUS
Status: DISPENSED
Start: 2023-08-25

## (undated) RX ORDER — FENTANYL CITRATE 50 UG/ML
INJECTION, SOLUTION INTRAMUSCULAR; INTRAVENOUS
Status: DISPENSED
Start: 2023-07-30

## (undated) RX ORDER — CEFAZOLIN SODIUM/WATER 2 G/20 ML
SYRINGE (ML) INTRAVENOUS
Status: DISPENSED
Start: 2023-03-02

## (undated) RX ORDER — FENTANYL CITRATE 0.05 MG/ML
INJECTION, SOLUTION INTRAMUSCULAR; INTRAVENOUS
Status: DISPENSED
Start: 2023-03-02

## (undated) RX ORDER — FENTANYL CITRATE 50 UG/ML
INJECTION, SOLUTION INTRAMUSCULAR; INTRAVENOUS
Status: DISPENSED
Start: 2020-08-28

## (undated) RX ORDER — PROPOFOL 10 MG/ML
INJECTION, EMULSION INTRAVENOUS
Status: DISPENSED
Start: 2023-08-25

## (undated) RX ORDER — PROPOFOL 10 MG/ML
INJECTION, EMULSION INTRAVENOUS
Status: DISPENSED
Start: 2023-03-02

## (undated) RX ORDER — ACETAMINOPHEN 325 MG/1
TABLET ORAL
Status: DISPENSED
Start: 2023-08-25

## (undated) RX ORDER — CEFAZOLIN SODIUM 2 G/100ML
INJECTION, SOLUTION INTRAVENOUS
Status: DISPENSED
Start: 2020-08-28

## (undated) RX ORDER — DEXAMETHASONE SODIUM PHOSPHATE 4 MG/ML
INJECTION, SOLUTION INTRA-ARTICULAR; INTRALESIONAL; INTRAMUSCULAR; INTRAVENOUS; SOFT TISSUE
Status: DISPENSED
Start: 2020-08-28

## (undated) RX ORDER — LIDOCAINE HYDROCHLORIDE 20 MG/ML
INJECTION, SOLUTION EPIDURAL; INFILTRATION; INTRACAUDAL; PERINEURAL
Status: DISPENSED
Start: 2020-08-28

## (undated) RX ORDER — ACETAMINOPHEN 325 MG/1
TABLET ORAL
Status: DISPENSED
Start: 2023-03-02

## (undated) RX ORDER — LIDOCAINE HYDROCHLORIDE 10 MG/ML
INJECTION, SOLUTION EPIDURAL; INFILTRATION; INTRACAUDAL; PERINEURAL
Status: DISPENSED
Start: 2023-03-02

## (undated) RX ORDER — HYDROCODONE BITARTRATE AND ACETAMINOPHEN 5; 325 MG/1; MG/1
TABLET ORAL
Status: DISPENSED
Start: 2020-08-28

## (undated) RX ORDER — ONDANSETRON 2 MG/ML
INJECTION INTRAMUSCULAR; INTRAVENOUS
Status: DISPENSED
Start: 2020-08-28

## (undated) RX ORDER — BUPIVACAINE HYDROCHLORIDE 5 MG/ML
INJECTION, SOLUTION EPIDURAL; INTRACAUDAL
Status: DISPENSED
Start: 2023-03-02

## (undated) RX ORDER — FENTANYL CITRATE 50 UG/ML
INJECTION, SOLUTION INTRAMUSCULAR; INTRAVENOUS
Status: DISPENSED
Start: 2023-03-02

## (undated) RX ORDER — CEFAZOLIN SODIUM/WATER 2 G/20 ML
SYRINGE (ML) INTRAVENOUS
Status: DISPENSED
Start: 2023-07-30

## (undated) RX ORDER — FENTANYL CITRATE 0.05 MG/ML
INJECTION, SOLUTION INTRAMUSCULAR; INTRAVENOUS
Status: DISPENSED
Start: 2020-08-28